# Patient Record
Sex: FEMALE | Race: WHITE | Employment: OTHER | URBAN - METROPOLITAN AREA
[De-identification: names, ages, dates, MRNs, and addresses within clinical notes are randomized per-mention and may not be internally consistent; named-entity substitution may affect disease eponyms.]

---

## 2017-02-05 ENCOUNTER — HOSPITAL ENCOUNTER (EMERGENCY)
Facility: CLINIC | Age: 82
Discharge: HOME | End: 2017-02-05
Attending: EMERGENCY MEDICINE | Admitting: EMERGENCY MEDICINE
Payer: MEDICARE

## 2017-02-05 DIAGNOSIS — K59.00 CONSTIPATION, UNSPECIFIED CONSTIPATION TYPE: ICD-10-CM

## 2017-02-05 PROCEDURE — 99282 EMERGENCY DEPT VISIT SF MDM: CPT

## 2017-02-05 ASSESSMENT — ENCOUNTER SYMPTOMS
CONSTIPATION: 1
FEVER: 0
ABDOMINAL PAIN: 0

## 2017-02-05 NOTE — ED PROVIDER NOTES
History     Chief Complaint:  Constipation    HPI   Christy Rueda is a 89 year old female with a history of TIA, chronic constipation on senna, hypertension, hyperlipidemia, and hypothyroidism who presents with her son, Oneil, to the emergency department today for evaluation of constipation. Ms. Rueda has bene suffering from constipation for the last few days. Earlier today, nurses gave patient 2 tablets of Dulcolax with no improvement in symptoms prompting visit. Of note, patient's last bowel movement was 3-4 days prior. Patient also reports this morning, she had vomited. Here, patient endorses rectal pain although it is gone after  having a large bowel movement. No reported fever or abdominal pain. Patient currently lives in assisted living and uses a walker for ambulation.     Allergies:  Demerol  Levaquin  Morphine  Penicillins    Medications:    Sennosides (Senokot) 8.6 Mg Tablet  Polyethylene Glycol (Miralax/Glycolax) Packet  Ciprofloxacin Po  Bisacodyl Re  Tramadol Hcl Po  Sodium Phosphate (Fleet Enema) 7-19 Gm/118ml Rectal Enema  Psyllium (Metamucil/Konsyl) Packet  Acetaminophen (Tylenol Po)  Amlodipine Besylate (Norvasc Po)  Vitamin D, Cholecalciferol, Po  Hydrocodone-Acetaminophen (Vicodin) 5-500 Mg Per Tablet  Aspirin 81 Mg Ec Tablet  Omeprazole Po  Atorvastatin Calcium Po  Losartan Potassium Po  Metoprolol Succinate Po  Levothyroxine Sodium (Levoxyl Po)  Omega-3 Fatty Acids (Omega-3 Fish Oil Po)  Docusate Sodium Po  Nitrofurantoin Monohyd Macro (Macrobid Po)    Past Medical History:    Unresponsive episode  ACP  Transient cerebral ischemia  DNR (do not resuscitate)    Compression fracture   TIA (transient ischemic attack)   Radial head fracture   Hyperlipidemia   Cerebral atherosclerosis   Essential hypertension   Heart disease   Calculus of gallbladder   Shortness of breath    Malignant neoplasm of female breast (HC)    Osteoporosis   Esophageal reflux   Constipation   Hypothyroidism   Essential  hypertension   Spinal stenosis, lumbar region, without neurogenic claudication    Past Surgical History:    Spine Surgery   Knee Replacement   Thyroidectomy     Septoplasty      Section x 3  Cataract Removal     Breast Surgery    Family History:    History reviewed. No pertinent family history.     Social History:  Marital Status:   [5]  Tobacco: Former Smoker-- 0.50 packs/day for 32 years  Alcohol: Positive (occassionally once a week)  Presents with Oneil, the son.   PCP: Kathy Pratt  Currently lives in independent living at [facility].       Review of Systems   Constitutional: Negative for fever.   Gastrointestinal: Positive for constipation. Negative for abdominal pain.   All other systems reviewed and are negative.    Physical Exam   First Vitals:     No data found.    Physical Exam  Constitutional:  Oriented to person, place, and time.      Appears well-developed and well-nourished. Hard of hearing.  HENT:   Head:    Normocephalic and atraumatic. Bilateral hearing aids.  Right Ear:   Tympanic membrane and external ear normal.   Left Ear:   Tympanic membrane and external ear normal.   Mouth/Throat:   Oropharynx is clear and moist.      Mucous membranes are normal.   Eyes:    Conjunctivae normal and EOM are normal.      Pupils are equal, round, and reactive to light.   Neck:    Normal range of motion. Neck supple.   Cardiovascular:  Normal rate, regular rhythm, S1 normal and S2 normal.      No gallop and no friction rub. No murmur heard.  Pulmonary/Chest:  Breath sounds normal. No respiratory distress.      No wheezes. No rhonchi. No rales.   Abdominal:   Soft. No hepatosplenomegaly.      Active bowel sounds. Mildly distended.      Mild diffuse tenderness to palpation.      No rebound and no CVA tenderness.   Musculoskeletal:  Normal range of motion.   Genitourinary   Erythema around rectum. Minimal stool in rectal vault.   Neurological:   Alert and oriented to person, place, and time.  "Normal strength.      GCS eye subscore is 4. GCS verbal subscore is 5.      GCS motor subscore is 6.   Skin:    Skin is warm and dry.   Psychiatric:   Normal mood and affect.      Speech is normal and behavior is normal.      Judgment and thought content normal.      Cognition and memory are normal.     Emergency Department Course   Emergency Department Course:  While waiting in ED room, nurse notes patient had a large bowel movement and has a relief in her symptoms.     Nursing notes and vitals reviewed.  15:11 I performed an exam of the patient as documented above.    15:28 I called to nurse Maia at [facility], but left a message to give me a call back.     15:44 Recheck patient.     15:49 Call back from nurse Maia. She stated patient was brought in for impaction and as long as she had a bowel movement, then she can return.     15:50 Recheck patient. Plan explained to the Patient and son. Patient discharged home with instructions regarding supportive care, medications, and reasons to return. The importance of close follow-up was reviewed.  Impression & Plan    Medical Decision Making:  Christy Rueda is a 89 year old female with a history of constipation who was sent here due to \"impaction\" per home care nurse. Before I saw the patient, she had a large bowel movement. She is feeling better and wondering why she is here. Her examination is unremarkable. There is no stool in the rectal vault. She is on stool softners and has laxative regimen at her place and she can be followed as needed.     Assessment: Constipation relived.   Disposition: Home  Discharge Instructions: Follow up as needed    Diagnosis:    ICD-10-CM    1. Constipation, unspecified constipation type K59.00        Disposition:  discharged to home with sonOneil.     Scribe Disclosure:  GREGORY, Page Queen, am serving as a scribe at 3:11 PM on 2/5/2017 to document services personally performed by Edna Castelan MD, " based on my observations and the provider's statements to me.  Page Queen  2/5/2017    EMERGENCY DEPARTMENT        Edna Castelan MD  02/05/17 [id number]

## 2017-02-05 NOTE — ED NOTES
Patient had large formed BM in brief. Also noted to have several more formed stools fall when cleaning patient up. Patient states she feels better now.  Urinary cath clamped at patients leg.

## 2017-02-05 NOTE — ED AVS SNAPSHOT
Emergency Department    [address] [facility] [address]    Phone:  [phone]    Fax:  [phone]                                       Christy Rueda   MRN: [id number]    Department:   Emergency Department   Date of Visit:  2/5/2017           Patient Information     Date Of Birth          8/31/1915        Your diagnoses for this visit were:     Constipation, unspecified constipation type        You were seen by Edna Castelan MD.      Follow-up Information     Follow up with Kathy Pratt    Specialty:  Internal Medicine    Why:  As needed    Contact information:    [facility]  [address] [facility] [address]  [facility] [id number] [phone]          Discharge Instructions       Discharge Instructions  Constipation  Your doctor has diagnosed you with constipation. Constipation can cause severe cramping pain and your physician thinks this is the cause of your abdominal pain today.  People usually recognize that they are constipated because they have difficulty having bowel movements, are not having bowel movements frequently enough, or are not having large enough bowel movements. Sometimes, especially in children or older people, you do not recognize that you are constipated until it becomes severe. The most common cause of constipation is a combination of lack of exercise and not eating enough fruits, vegetables and whole grains. Constipation can also be a side effect of medications, such as narcotics, or may be caused by a disease of the digestive system.    Return to the Emergency Department if:    Your abdominal pain worsens or does not improve after a bowel movement.    You become very weak.    You get an oral temperature above 102oF or as directed by your doctor.    You have blood in your stools (bright red or black, tarry stools).    You keep throwing up or can t drink liquids.    Your see blood when you throw up.    Your stomach gets bloated or bigger.    You have new  symptoms or anything that worries you.    What can I do to help myself?    If your doctor gave you a cathartic medication, like magnesium citrate or GoLytely  (polyethylene glycol), you can expect to have cramps and gas pains after taking it. You can expect to have a number of bowel movements and even diarrhea in the course of clearing your bowels.  You will know your bowels have been cleaned out after you pass clear liquid. The cramps and gas should let up after you have emptied your bowels. You may want to wait until morning to take this type of medication so you aren t up in the night.     Sometimes instead of cathartics, we recommend laxatives like milk of magnesia to move your bowels more slowly, or an enema to help the bowels to move. Read and follow the package directions, or follow your physician s instructions.    Once you have become very constipated, it takes time for your bowels to return to normal and you need to be very careful to prevent becoming constipated again. Take a laxative if you don t move your bowels at least every two days.       Eat foods that have a lot of fiber. Good choices are fruits, vegetables, prune juice, apple juice and high fiber cereal. Limit dairy products such as milk and cheese, since these can make constipation worse.     Drink plenty of water and other fluids.     When you feel the need to go to the bathroom, go to the bathroom. Don t hold it.    Miralax , Metamucil , Colace , Senna or fiber supplements can be used daily.  Miralax  daily is often the best choice for children.    FOLLOW UP WITH YOUR REGULAR DOCTOR IF YOUR CONSTIPATION IS NOT IMPROVING.  Sometimes, chronic constipation requires further testing to determine the cause. If you are over 50 years old, you may need a colonoscopy if you have not had one before.   If you were given a prescription for medicine here today, be sure to read all of the information (including the package insert) that comes with your  prescription.  This will include important information about the medicine, its side effects, and any warnings that you need to know about.  The pharmacist who fills the prescription can provide more information and answer questions you may have about the medicine.  If you have questions or concerns that the pharmacist cannot address, please call or return to the Emergency Department.   Opioid Medication Information    Pain medications are among the most commonly prescribed medicines, so we are including this information for all our patients. If you did not receive pain medication or get a prescription for pain medicine, you can ignore it.     You may have been given a prescription for an opioid (narcotic) pain medicine and/or have received a pain medicine while here in the Emergency Department. These medicines can make you drowsy or impaired. You must not drive, operate dangerous equipment, or engage in any other dangerous activities while taking these medications. If you drive while taking these medications, you could be arrested for DUI, or driving under the influence. Do not drink any alcohol while you are taking these medications.     Opioid pain medications can cause addiction. If you have a history of chemical dependency of any type, you are at a higher risk of becoming addicted to pain medications.  Only take these prescribed medications to treat your pain when all other options have been tried. Take it for as short a time and as few doses as possible. Store your pain pills in a secure place, as they are frequently stolen and provide a dangerous opportunity for children or visitors in your house to start abusing these powerful medications. We will not replace any lost or stolen medicine.  As soon as your pain is better, you should flush all your remaining medication.     Many prescription pain medications contain Tylenol  (acetaminophen), including Vicodin , Tylenol #3 , Norco , Lortab , and Percocet .  You  should not take any extra pills of Tylenol  if you are using these prescription medications or you can get very sick.  Do not ever take more than 3000 mg of acetaminophen in any 24 hour period.    All opioids tend to cause constipation. Drink plenty of water and eat foods that have a lot of fiber, such as fruits, vegetables, prune juice, apple juice and high fiber cereal.  Take a laxative if you don t move your bowels at least every other day. Miralax , Milk of Magnesia, Colace , or Senna  can be used to keep you regular.      Remember that you can always come back to the Emergency Department if you are not able to see your regular doctor in the amount of time listed above, if you get any new symptoms, or if there is anything that worries you.      24 Hour Appointment Hotline       To make an appointment at any [facility] call [phone] ([phone]). If you don't have a family doctor or clinic, we will help you find one. [facility] clinics are conveniently located to serve the needs of you and your family.             Review of your medicines      Our records show that you are taking the medicines listed below. If these are incorrect, please call your family doctor or clinic.        Dose / Directions Last dose taken    aspirin 81 MG EC tablet   Dose:  81 mg   Quantity:  90 tablet        Take 1 tablet by mouth daily.   Refills:  3        ATORVASTATIN CALCIUM PO   Dose:  10 mg        Take 10 mg by mouth At Bedtime.   Refills:  0        BISACODYL RE   Dose:  1 suppository        Place 1 suppository rectally daily as needed   Refills:  0        CIPROFLOXACIN PO   Dose:  250 mg        Take 250 mg by mouth daily as needed   Refills:  0        DOCUSATE SODIUM PO   Dose:  100 mg        Take 100 mg by mouth At Bedtime.   Refills:  0        HYDROcodone-acetaminophen 5-500 MG per tablet   Commonly known as:  VICODIN   Dose:  1-2 tablet        Take 1-2 tablets by mouth every 6 hours as needed for moderate to severe  pain   Refills:  0        LEVOXYL PO   Dose:  88 mcg        Take 88 mcg by mouth daily.   Refills:  0        LOSARTAN POTASSIUM PO   Dose:  100 mg        Take 100 mg by mouth daily   Refills:  0        MACROBID PO   Dose:  200 mg        Take 200 mg by mouth. Daily x2 days; as needed each cath change.   Refills:  0        METOPROLOL SUCCINATE PO   Dose:  50 mg        Take 50 mg by mouth daily   Refills:  0        NORVASC PO   Dose:  2.5 mg   Indication:  High Blood Pressure        Take 2.5 mg by mouth daily   Refills:  0        OMEGA-3 FISH OIL PO   Dose:  1 g        Take 1 g by mouth daily.   Refills:  0        OMEPRAZOLE PO   Dose:  20 mg        Take 20 mg by mouth daily.   Refills:  0        polyethylene glycol Packet   Commonly known as:  MIRALAX/GLYCOLAX   Dose:  1 packet        Take 1 packet by mouth daily   Refills:  0        psyllium Packet   Commonly known as:  METAMUCIL/KONSYL   Dose:  1 packet        Take 1 packet by mouth daily as needed for constipation   Refills:  0        sennosides 8.6 MG tablet   Commonly known as:  SENOKOT   Dose:  1 tablet        Take 1 tablet by mouth daily   Refills:  0        sodium phosphate 7-19 GM/118ML rectal enema   Dose:  1 enema        Place 1 enema rectally daily as needed for constipation   Refills:  0        TRAMADOL HCL PO   Dose:  50 mg        Take 50 mg by mouth every 6 hours as needed for moderate to severe pain   Refills:  0        TYLENOL PO   Dose:  1000 mg   Indication:  Pain        Take 1,000 mg by mouth 2 times daily   Refills:  0        VITAMIN D (CHOLECALCIFEROL) PO   Dose:  1000 Units        Take 1,000 Units by mouth daily   Refills:  0                Orders Needing Specimen Collection     None      Pending Results     No orders found from 2/4/2017 to 2/6/2017.            Pending Culture Results     No orders found from 2/4/2017 to 2/6/2017.             Test Results from your hospital stay            Clinical Quality Measure: Blood Pressure Screening      "Your blood pressure was checked while you were in the emergency department today. The last reading we obtained was    . Please read the guidelines below about what these numbers mean and what you should do about them.  If your systolic blood pressure (the top number) is less than 120 and your diastolic blood pressure (the bottom number) is less than 80, then your blood pressure is normal. There is nothing more that you need to do about it.  If your systolic blood pressure (the top number) is 120-139 or your diastolic blood pressure (the bottom number) is 80-89, your blood pressure may be higher than it should be. You should have your blood pressure rechecked within a year by a primary care provider.  If your systolic blood pressure (the top number) is 140 or greater or your diastolic blood pressure (the bottom number) is 90 or greater, you may have high blood pressure. High blood pressure is treatable, but if left untreated over time it can put you at risk for heart attack, stroke, or kidney failure. You should have your blood pressure rechecked by a primary care provider within the next 4 weeks.  If your provider in the emergency department today gave you specific instructions to follow-up with your doctor or provider even sooner than that, you should follow that instruction and not wait for up to 4 weeks for your follow-up visit.        Thank you for choosing [facility]       Thank you for choosing [facility] for your care. Our goal is always to provide you with excellent care. Hearing back from our patients is one way we can continue to improve our services. Please take a few minutes to complete the written survey that you may receive in the mail after you visit with us. Thank you!        [employer] Information     [employer] lets you send messages to your doctor, view your test results, renew your prescriptions, schedule appointments and more. To sign up, go to [email or web address] . Click on \"Log in\" on the left side " "of the screen, which will take you to the Welcome page. Then click on \"Sign up Now\" on the right side of the page.     You will be asked to enter the access code listed below, as well as some personal information. Please follow the directions to create your username and password.     Your access code is: [email or web address]  Expires: 2017  3:51 PM     Your access code will  in 90 days. If you need help or a new code, please call your [facility] clinic or [phone].        Care EveryWhere ID     This is your Care EveryWhere ID. This could be used by other organizations to access your [facility] medical records  [id number]        After Visit Summary       This is your record. Keep this with you and show to your community pharmacist(s) and doctor(s) at your next visit.                  "

## 2017-02-05 NOTE — DISCHARGE INSTRUCTIONS
Discharge Instructions  Constipation  Your doctor has diagnosed you with constipation. Constipation can cause severe cramping pain and your physician thinks this is the cause of your abdominal pain today.  People usually recognize that they are constipated because they have difficulty having bowel movements, are not having bowel movements frequently enough, or are not having large enough bowel movements. Sometimes, especially in children or older people, you do not recognize that you are constipated until it becomes severe. The most common cause of constipation is a combination of lack of exercise and not eating enough fruits, vegetables and whole grains. Constipation can also be a side effect of medications, such as narcotics, or may be caused by a disease of the digestive system.    Return to the Emergency Department if:    Your abdominal pain worsens or does not improve after a bowel movement.    You become very weak.    You get an oral temperature above 102oF or as directed by your doctor.    You have blood in your stools (bright red or black, tarry stools).    You keep throwing up or can t drink liquids.    Your see blood when you throw up.    Your stomach gets bloated or bigger.    You have new symptoms or anything that worries you.    What can I do to help myself?    If your doctor gave you a cathartic medication, like magnesium citrate or GoLytely  (polyethylene glycol), you can expect to have cramps and gas pains after taking it. You can expect to have a number of bowel movements and even diarrhea in the course of clearing your bowels.  You will know your bowels have been cleaned out after you pass clear liquid. The cramps and gas should let up after you have emptied your bowels. You may want to wait until morning to take this type of medication so you aren t up in the night.     Sometimes instead of cathartics, we recommend laxatives like milk of magnesia to move your bowels more slowly, or an enema to  help the bowels to move. Read and follow the package directions, or follow your physician s instructions.    Once you have become very constipated, it takes time for your bowels to return to normal and you need to be very careful to prevent becoming constipated again. Take a laxative if you don t move your bowels at least every two days.       Eat foods that have a lot of fiber. Good choices are fruits, vegetables, prune juice, apple juice and high fiber cereal. Limit dairy products such as milk and cheese, since these can make constipation worse.     Drink plenty of water and other fluids.     When you feel the need to go to the bathroom, go to the bathroom. Don t hold it.    Miralax , Metamucil , Colace , Senna or fiber supplements can be used daily.  Miralax  daily is often the best choice for children.    FOLLOW UP WITH YOUR REGULAR DOCTOR IF YOUR CONSTIPATION IS NOT IMPROVING.  Sometimes, chronic constipation requires further testing to determine the cause. If you are over 50 years old, you may need a colonoscopy if you have not had one before.   If you were given a prescription for medicine here today, be sure to read all of the information (including the package insert) that comes with your prescription.  This will include important information about the medicine, its side effects, and any warnings that you need to know about.  The pharmacist who fills the prescription can provide more information and answer questions you may have about the medicine.  If you have questions or concerns that the pharmacist cannot address, please call or return to the Emergency Department.   Opioid Medication Information    Pain medications are among the most commonly prescribed medicines, so we are including this information for all our patients. If you did not receive pain medication or get a prescription for pain medicine, you can ignore it.     You may have been given a prescription for an opioid (narcotic) pain medicine  and/or have received a pain medicine while here in the Emergency Department. These medicines can make you drowsy or impaired. You must not drive, operate dangerous equipment, or engage in any other dangerous activities while taking these medications. If you drive while taking these medications, you could be arrested for DUI, or driving under the influence. Do not drink any alcohol while you are taking these medications.     Opioid pain medications can cause addiction. If you have a history of chemical dependency of any type, you are at a higher risk of becoming addicted to pain medications.  Only take these prescribed medications to treat your pain when all other options have been tried. Take it for as short a time and as few doses as possible. Store your pain pills in a secure place, as they are frequently stolen and provide a dangerous opportunity for children or visitors in your house to start abusing these powerful medications. We will not replace any lost or stolen medicine.  As soon as your pain is better, you should flush all your remaining medication.     Many prescription pain medications contain Tylenol  (acetaminophen), including Vicodin , Tylenol #3 , Norco , Lortab , and Percocet .  You should not take any extra pills of Tylenol  if you are using these prescription medications or you can get very sick.  Do not ever take more than 3000 mg of acetaminophen in any 24 hour period.    All opioids tend to cause constipation. Drink plenty of water and eat foods that have a lot of fiber, such as fruits, vegetables, prune juice, apple juice and high fiber cereal.  Take a laxative if you don t move your bowels at least every other day. Miralax , Milk of Magnesia, Colace , or Senna  can be used to keep you regular.      Remember that you can always come back to the Emergency Department if you are not able to see your regular doctor in the amount of time listed above, if you get any new symptoms, or if there is  anything that worries you.

## 2017-02-05 NOTE — ED AVS SNAPSHOT
Emergency Department    [address] [place] [address]    Phone:  [phone]    Fax:  [phone]                                       Christy Rueda   MRN: [id number]    Department:   Emergency Department   Date of Visit:  2/5/2017           After Visit Summary Signature Page     I have received my discharge instructions, and my questions have been answered. I have discussed any challenges I see with this plan with the nurse or doctor.    ..........................................................................................................................................  Patient/Patient Representative Signature      ..........................................................................................................................................  Patient Representative Print Name and Relationship to Patient    ..................................................               ................................................  Date                                            Time    ..........................................................................................................................................  Reviewed by Signature/Title    ...................................................              ..............................................  Date                                                            Time

## 2017-04-27 ENCOUNTER — HOSPITAL ENCOUNTER (EMERGENCY)
Facility: CLINIC | Age: 82
Discharge: HOME | End: 2017-04-28
Attending: EMERGENCY MEDICINE | Admitting: EMERGENCY MEDICINE
Payer: MEDICARE

## 2017-04-27 VITALS
RESPIRATION RATE: 15 BRPM | HEART RATE: 76 BPM | WEIGHT: 114 LBS | SYSTOLIC BLOOD PRESSURE: 182 MMHG | TEMPERATURE: 98.1 F | OXYGEN SATURATION: 96 % | HEIGHT: 62 IN | DIASTOLIC BLOOD PRESSURE: 84 MMHG | BODY MASS INDEX: 20.98 KG/M2

## 2017-04-27 DIAGNOSIS — Z46.6 URINARY CATHETER (FOLEY) CHANGE REQUIRED: ICD-10-CM

## 2017-04-27 PROCEDURE — 99283 EMERGENCY DEPT VISIT LOW MDM: CPT

## 2017-04-27 PROCEDURE — 51702 INSERT TEMP BLADDER CATH: CPT

## 2017-04-27 NOTE — ED AVS SNAPSHOT
Emergency Department    [address] [facility] [address]    Phone:  [phone]    Fax:  [phone]                                       Christy Rueda   MRN: [id number]    Department:   Emergency Department   Date of Visit:  4/27/2017           Patient Information     Date Of Birth          8/31/1915        Your diagnoses for this visit were:     Urinary catheter (Hidalgo) change required        You were seen by Trierweiler, Chad A, MD.      Follow-up Information     Follow up with Kathy Pratt    Specialty:  Internal Medicine    Why:  As needed    Contact information:    [facility]  [address] [facility] [address]  [facility] [id number] [phone]        Discharge References/Attachments     INDWELLING URINARY CATHETER, DISCHARGE INSTRUCTIONS (ENGLISH)      24 Hour Appointment Hotline       To make an appointment at any [facility] [place], call [phone] ([phone]). If you don't have a family doctor or clinic, we will help you find one. [facility] clinics are conveniently located to serve the needs of you and your family.             Review of your medicines      Our records show that you are taking the medicines listed below. If these are incorrect, please call your family doctor or clinic.        Dose / Directions Last dose taken    aspirin 81 MG EC tablet   Dose:  81 mg   Quantity:  90 tablet        Take 1 tablet by mouth daily.   Refills:  3        ATORVASTATIN CALCIUM PO   Dose:  10 mg        Take 10 mg by mouth At Bedtime.   Refills:  0        BISACODYL RE   Dose:  1 suppository        Place 1 suppository rectally daily as needed   Refills:  0        CIPROFLOXACIN PO   Dose:  250 mg        Take 250 mg by mouth daily as needed   Refills:  0        DOCUSATE SODIUM PO   Dose:  100 mg        Take 100 mg by mouth At Bedtime.   Refills:  0        HYDROcodone-acetaminophen 5-500 MG per tablet   Commonly known as:  VICODIN   Dose:  1-2 tablet        Take 1-2 tablets by mouth every 6  hours as needed for moderate to severe pain   Refills:  0        LEVOXYL PO   Dose:  88 mcg        Take 88 mcg by mouth daily.   Refills:  0        LOSARTAN POTASSIUM PO   Dose:  100 mg        Take 100 mg by mouth daily   Refills:  0        MACROBID PO   Dose:  200 mg        Take 200 mg by mouth. Daily x2 days; as needed each cath change.   Refills:  0        METOPROLOL SUCCINATE PO   Dose:  50 mg        Take 50 mg by mouth daily   Refills:  0        NORVASC PO   Dose:  2.5 mg   Indication:  High Blood Pressure        Take 2.5 mg by mouth daily   Refills:  0        OMEGA-3 FISH OIL PO   Dose:  1 g        Take 1 g by mouth daily.   Refills:  0        OMEPRAZOLE PO   Dose:  20 mg        Take 20 mg by mouth daily.   Refills:  0        polyethylene glycol Packet   Commonly known as:  MIRALAX/GLYCOLAX   Dose:  1 packet        Take 1 packet by mouth daily   Refills:  0        psyllium Packet   Commonly known as:  METAMUCIL/KONSYL   Dose:  1 packet        Take 1 packet by mouth daily as needed for constipation   Refills:  0        sennosides 8.6 MG tablet   Commonly known as:  SENOKOT   Dose:  1 tablet        Take 1 tablet by mouth daily   Refills:  0        sodium phosphate 7-19 GM/118ML rectal enema   Dose:  1 enema        Place 1 enema rectally daily as needed for constipation   Refills:  0        TRAMADOL HCL PO   Dose:  50 mg        Take 50 mg by mouth every 6 hours as needed for moderate to severe pain   Refills:  0        TYLENOL PO   Dose:  1000 mg   Indication:  Pain        Take 1,000 mg by mouth 2 times daily   Refills:  0        VITAMIN D (CHOLECALCIFEROL) PO   Dose:  1000 Units        Take 1,000 Units by mouth daily   Refills:  0                Orders Needing Specimen Collection     None      Pending Results     No orders found for last 3 day(s).            Pending Culture Results     No orders found for last 3 day(s).            Test Results From Your Hospital Stay               Clinical Quality Measure: Blood  Pressure Screening     Your blood pressure was checked while you were in the emergency department today. The last reading we obtained was  BP: 182/84 . Please read the guidelines below about what these numbers mean and what you should do about them.  If your systolic blood pressure (the top number) is less than 120 and your diastolic blood pressure (the bottom number) is less than 80, then your blood pressure is normal. There is nothing more that you need to do about it.  If your systolic blood pressure (the top number) is 120-139 or your diastolic blood pressure (the bottom number) is 80-89, your blood pressure may be higher than it should be. You should have your blood pressure rechecked within a year by a primary care provider.  If your systolic blood pressure (the top number) is 140 or greater or your diastolic blood pressure (the bottom number) is 90 or greater, you may have high blood pressure. High blood pressure is treatable, but if left untreated over time it can put you at risk for heart attack, stroke, or kidney failure. You should have your blood pressure rechecked by a primary care provider within the next 4 weeks.  If your provider in the emergency department today gave you specific instructions to follow-up with your doctor or provider even sooner than that, you should follow that instruction and not wait for up to 4 weeks for your follow-up visit.        Thank you for choosing [facility]       Thank you for choosing [facility] for your care. Our goal is always to provide you with excellent care. Hearing back from our patients is one way we can continue to improve our services. Please take a few minutes to complete the written survey that you may receive in the mail after you visit with us. Thank you!        [email or web address] Information     [employer] lets you send messages to your doctor, view your test results, renew your prescriptions, schedule appointments and more. To sign up, go to [email or web address]  "Click on \"Log in\" on the left side of the screen, which will take you to the Welcome page. Then click on \"Sign up Now\" on the right side of the page.     You will be asked to enter the access code listed below, as well as some personal information. Please follow the directions to create your username and password.     Your access code is: [email or web address]  Expires: 2017  4:51 PM     Your access code will  in 90 days. If you need help or a new code, please call your [facility] clinic or [phone].        Care EveryWhere ID     This is your Care EveryWhere ID. This could be used by other organizations to access your [facility] medical records  [id number]        After Visit Summary       This is your record. Keep this with you and show to your community pharmacist(s) and doctor(s) at your next visit.                  "

## 2017-04-27 NOTE — ED AVS SNAPSHOT
Emergency Department    [address] [place] [address]    Phone:  [phone]    Fax:  [phone]                                       Christy Rueda   MRN: [id number]    Department:   Emergency Department   Date of Visit:  4/27/2017           After Visit Summary Signature Page     I have received my discharge instructions, and my questions have been answered. I have discussed any challenges I see with this plan with the nurse or doctor.    ..........................................................................................................................................  Patient/Patient Representative Signature      ..........................................................................................................................................  Patient Representative Print Name and Relationship to Patient    ..................................................               ................................................  Date                                            Time    ..........................................................................................................................................  Reviewed by Signature/Title    ...................................................              ..............................................  Date                                                            Time

## 2017-04-28 NOTE — ED PROVIDER NOTES
"  History     Chief Complaint:  Catheter Problem     HPI   Christy Rueda is a 89 year old female who presents to the emergency department today for evaluation of a catheter problem. The patient presents with her son from her assisted living facility this evening after her catheter fell out. The facility is unable to place a new catheter due to legal reasons. The patient currently has no pain and a new catheter was placed here without difficulty.     Allergies:  Demerol - nausea and vomiting   Levaquin  Morphine   Penicillins - nausea and vomiting      Medications:    Senokot  Miralax  Bisacodyl   Tramadol  Fleet Enema  Norvasc  Aspirin   Omeprazole  Atorvastatin   Losartan Potassium   Metoprolol Succinate   Docusate     Past Medical History:    Transient cerebral ischemia     Past Surgical History:    History reviewed. No pertinent past surgical history.    Family History:    History reviewed. No pertinent family history.     Social History:  The patient was accompanied to the ED by her son.  Smoking Status: Former Smoker  Smokeless Tobacco: Negative  Alcohol Use: Positive  Marital Status:   [5]     Review of Systems   All other systems reviewed and are negative.    Physical Exam   Vitals:   Patient Vitals for the past 24 hrs:   BP Temp Temp src Pulse Resp SpO2 Height Weight   04/27/17 2319 182/84 98.1  F (36.7  C) Temporal 76 15 96 % - -   04/27/17 2318 - - - - - - 1.575 m (5' 2\") 51.7 kg (114 lb)     Physical Exam    Abdomen:  Positive bowel sounds.  Abdomen is soft and non-distended, without focal tenderness.    Musculoskeletal:  Normal movement of all extremities without evidence for deficit.    Skin:  Warm and dry without rashes.    Neurologic:  Non-focal exam without asymmetric weakness or numbness.     Psychiatric:  Normal affect with appropriate interaction with examiner.    Emergency Department Course     Nursing notes and vitals reviewed.  I performed an exam of the patient as documented " above.   0000 Patient was rechecked.   I discussed the treatment plan with the patient and son. They expressed understanding of this plan and consented to discharge. They will be discharged home with instructions for care and follow up. In addition, the patient will return to the emergency department if their symptoms persist, worsen, if new symptoms arise or if there is any concern.  All questions were answered.    Impression & Plan      Medical Decision Making:  This delightful 89 year old presents simply for a change of her Hidalgo catheter, as hers fell out. She wears a chronic indwelling Hidalgo and otherwise has no complaints here for us. The nurse replaced her catheter and she feels comfortable going home. Son has no other medical concerns at this juncture. I invited them back to our facility at any point for any emergent conditions.     Diagnosis:    ICD-10-CM    1. Urinary catheter (Hidalgo) change required Z46.6      Disposition:   Discharge to home    Scribe Disclosure:  I, Yesika Dick, am serving as a scribe at 11:29 PM on 4/27/2017 to document services personally performed by Trierweiler, Chad A, MD, based on my observations and the provider's statements to me.    4/27/2017    EMERGENCY DEPARTMENT       Trierweiler, Chad A, MD  04/28/17 0732

## 2017-06-01 ENCOUNTER — APPOINTMENT (OUTPATIENT)
Dept: GENERAL RADIOLOGY | Facility: CLINIC | Age: 82
DRG: 175 | End: 2017-06-01
Attending: EMERGENCY MEDICINE
Payer: MEDICARE

## 2017-06-01 ENCOUNTER — HOSPITAL ENCOUNTER (INPATIENT)
Facility: CLINIC | Age: 82
LOS: 5 days | Discharge: NURSING, REHAB OR LONG-TERM CARE | DRG: 175 | End: 2017-06-06
Attending: EMERGENCY MEDICINE | Admitting: INTERNAL MEDICINE
Payer: MEDICARE

## 2017-06-01 ENCOUNTER — APPOINTMENT (OUTPATIENT)
Dept: CT IMAGING | Facility: CLINIC | Age: 82
DRG: 175 | End: 2017-06-01
Attending: EMERGENCY MEDICINE
Payer: MEDICARE

## 2017-06-01 ENCOUNTER — APPOINTMENT (OUTPATIENT)
Dept: ULTRASOUND IMAGING | Facility: CLINIC | Age: 82
DRG: 175 | End: 2017-06-01
Attending: INTERNAL MEDICINE
Payer: MEDICARE

## 2017-06-01 DIAGNOSIS — I26.99 OTHER ACUTE PULMONARY EMBOLISM: ICD-10-CM

## 2017-06-01 LAB
ALBUMIN UR-MCNC: 30 MG/DL
ANION GAP SERPL CALCULATED.3IONS-SCNC: 8 MMOL/L (ref 3–14)
APPEARANCE UR: CLEAR
BASOPHILS # BLD AUTO: 0 10E9/L (ref 0–0.2)
BASOPHILS NFR BLD AUTO: 0.1 %
BILIRUB UR QL STRIP: NEGATIVE
BUN SERPL-MCNC: 42 MG/DL (ref 7–30)
CALCIUM SERPL-MCNC: 9.7 MG/DL (ref 8.5–10.1)
CHLORIDE SERPL-SCNC: 107 MMOL/L (ref 94–109)
CO2 SERPL-SCNC: 27 MMOL/L (ref 20–32)
COLOR UR AUTO: YELLOW
CREAT SERPL-MCNC: 1.29 MG/DL (ref 0.52–1.04)
D DIMER PPP FEU-MCNC: 2.2 UG/ML FEU (ref 0–0.5)
DIFFERENTIAL METHOD BLD: ABNORMAL
EOSINOPHIL # BLD AUTO: 0 10E9/L (ref 0–0.7)
EOSINOPHIL NFR BLD AUTO: 0.1 %
ERYTHROCYTE [DISTWIDTH] IN BLOOD BY AUTOMATED COUNT: 14.5 % (ref 10–15)
GFR SERPL CREATININE-BSD FRML MDRD: 38 ML/MIN/1.7M2
GLUCOSE SERPL-MCNC: 117 MG/DL (ref 70–99)
GLUCOSE UR STRIP-MCNC: NEGATIVE MG/DL
HCT VFR BLD AUTO: 35 % (ref 35–47)
HGB BLD-MCNC: 11.4 G/DL (ref 11.7–15.7)
HGB UR QL STRIP: NEGATIVE
IMM GRANULOCYTES # BLD: 0 10E9/L (ref 0–0.4)
IMM GRANULOCYTES NFR BLD: 0.3 %
INR PPP: 1.05 (ref 0.86–1.14)
INTERPRETATION ECG - MUSE: NORMAL
KETONES UR STRIP-MCNC: NEGATIVE MG/DL
LEUKOCYTE ESTERASE UR QL STRIP: ABNORMAL
LMWH PPP CHRO-ACNC: 1.06 IU/ML
LYMPHOCYTES # BLD AUTO: 0.9 10E9/L (ref 0.8–5.3)
LYMPHOCYTES NFR BLD AUTO: 5.4 %
MCH RBC QN AUTO: 33 PG (ref 26.5–33)
MCHC RBC AUTO-ENTMCNC: 32.6 G/DL (ref 31.5–36.5)
MCV RBC AUTO: 101 FL (ref 78–100)
MONOCYTES # BLD AUTO: 1 10E9/L (ref 0–1.3)
MONOCYTES NFR BLD AUTO: 6 %
MUCOUS THREADS #/AREA URNS LPF: PRESENT /LPF
NEUTROPHILS # BLD AUTO: 13.9 10E9/L (ref 1.6–8.3)
NEUTROPHILS NFR BLD AUTO: 88.1 %
NITRATE UR QL: NEGATIVE
NRBC # BLD AUTO: 0 10*3/UL
NRBC BLD AUTO-RTO: 0 /100
NT-PROBNP SERPL-MCNC: 1612 PG/ML (ref 0–1800)
PH UR STRIP: 5.5 PH (ref 5–7)
PLATELET # BLD AUTO: 246 10E9/L (ref 150–450)
POTASSIUM SERPL-SCNC: 4.1 MMOL/L (ref 3.4–5.3)
RADIOLOGIST FLAGS: ABNORMAL
RBC # BLD AUTO: 3.45 10E12/L (ref 3.8–5.2)
RBC #/AREA URNS AUTO: 2 /HPF (ref 0–2)
SODIUM SERPL-SCNC: 142 MMOL/L (ref 133–144)
SP GR UR STRIP: 1.03 (ref 1–1.03)
SQUAMOUS #/AREA URNS AUTO: 2 /HPF (ref 0–1)
TROPONIN I SERPL-MCNC: NORMAL UG/L (ref 0–0.04)
URN SPEC COLLECT METH UR: ABNORMAL
UROBILINOGEN UR STRIP-MCNC: NORMAL MG/DL (ref 0–2)
WBC # BLD AUTO: 15.8 10E9/L (ref 4–11)
WBC #/AREA URNS AUTO: 14 /HPF (ref 0–2)

## 2017-06-01 PROCEDURE — 25000128 H RX IP 250 OP 636: Performed by: INTERNAL MEDICINE

## 2017-06-01 PROCEDURE — 99221 1ST HOSP IP/OBS SF/LOW 40: CPT | Mod: AI | Performed by: INTERNAL MEDICINE

## 2017-06-01 PROCEDURE — 25000125 ZZHC RX 250: Performed by: EMERGENCY MEDICINE

## 2017-06-01 PROCEDURE — 93005 ELECTROCARDIOGRAM TRACING: CPT

## 2017-06-01 PROCEDURE — 25000132 ZZH RX MED GY IP 250 OP 250 PS 637: Mod: GY | Performed by: INTERNAL MEDICINE

## 2017-06-01 PROCEDURE — 87186 SC STD MICRODIL/AGAR DIL: CPT | Performed by: INTERNAL MEDICINE

## 2017-06-01 PROCEDURE — 99285 EMERGENCY DEPT VISIT HI MDM: CPT | Mod: 25

## 2017-06-01 PROCEDURE — 93970 EXTREMITY STUDY: CPT

## 2017-06-01 PROCEDURE — 36415 COLL VENOUS BLD VENIPUNCTURE: CPT | Performed by: INTERNAL MEDICINE

## 2017-06-01 PROCEDURE — 71020 XR CHEST 2 VW: CPT

## 2017-06-01 PROCEDURE — 25000128 H RX IP 250 OP 636: Performed by: EMERGENCY MEDICINE

## 2017-06-01 PROCEDURE — 87088 URINE BACTERIA CULTURE: CPT | Performed by: INTERNAL MEDICINE

## 2017-06-01 PROCEDURE — 99207 ZZC DOWN CODE DUE TO INITIAL EXAM: CPT | Performed by: INTERNAL MEDICINE

## 2017-06-01 PROCEDURE — 85025 COMPLETE CBC W/AUTO DIFF WBC: CPT | Performed by: EMERGENCY MEDICINE

## 2017-06-01 PROCEDURE — 12000000 ZZH R&B MED SURG/OB

## 2017-06-01 PROCEDURE — 81001 URINALYSIS AUTO W/SCOPE: CPT | Performed by: INTERNAL MEDICINE

## 2017-06-01 PROCEDURE — A9270 NON-COVERED ITEM OR SERVICE: HCPCS | Mod: GY | Performed by: INTERNAL MEDICINE

## 2017-06-01 PROCEDURE — 85379 FIBRIN DEGRADATION QUANT: CPT | Performed by: EMERGENCY MEDICINE

## 2017-06-01 PROCEDURE — 71260 CT THORAX DX C+: CPT

## 2017-06-01 PROCEDURE — 84484 ASSAY OF TROPONIN QUANT: CPT | Performed by: EMERGENCY MEDICINE

## 2017-06-01 PROCEDURE — 80048 BASIC METABOLIC PNL TOTAL CA: CPT | Performed by: EMERGENCY MEDICINE

## 2017-06-01 PROCEDURE — 85520 HEPARIN ASSAY: CPT | Performed by: INTERNAL MEDICINE

## 2017-06-01 PROCEDURE — 85610 PROTHROMBIN TIME: CPT | Performed by: EMERGENCY MEDICINE

## 2017-06-01 PROCEDURE — 87086 URINE CULTURE/COLONY COUNT: CPT | Performed by: INTERNAL MEDICINE

## 2017-06-01 PROCEDURE — 83880 ASSAY OF NATRIURETIC PEPTIDE: CPT | Performed by: EMERGENCY MEDICINE

## 2017-06-01 PROCEDURE — 96374 THER/PROPH/DIAG INJ IV PUSH: CPT | Mod: 59

## 2017-06-01 RX ORDER — SODIUM CHLORIDE 9 MG/ML
INJECTION, SOLUTION INTRAVENOUS CONTINUOUS
Status: DISCONTINUED | OUTPATIENT
Start: 2017-06-01 | End: 2017-06-02

## 2017-06-01 RX ORDER — ONDANSETRON 2 MG/ML
4 INJECTION INTRAMUSCULAR; INTRAVENOUS EVERY 6 HOURS PRN
Status: DISCONTINUED | OUTPATIENT
Start: 2017-06-01 | End: 2017-06-06 | Stop reason: HOSPADM

## 2017-06-01 RX ORDER — ACETAMINOPHEN 500 MG
1000 TABLET ORAL 2 TIMES DAILY
Status: DISCONTINUED | OUTPATIENT
Start: 2017-06-01 | End: 2017-06-06 | Stop reason: HOSPADM

## 2017-06-01 RX ORDER — IOPAMIDOL 755 MG/ML
53 INJECTION, SOLUTION INTRAVASCULAR ONCE
Status: COMPLETED | OUTPATIENT
Start: 2017-06-01 | End: 2017-06-01

## 2017-06-01 RX ORDER — ONDANSETRON 4 MG/1
4 TABLET, ORALLY DISINTEGRATING ORAL EVERY 6 HOURS PRN
Status: DISCONTINUED | OUTPATIENT
Start: 2017-06-01 | End: 2017-06-06 | Stop reason: HOSPADM

## 2017-06-01 RX ORDER — ACETAMINOPHEN 325 MG/1
325 TABLET ORAL EVERY 8 HOURS PRN
Status: DISCONTINUED | OUTPATIENT
Start: 2017-06-01 | End: 2017-06-06 | Stop reason: HOSPADM

## 2017-06-01 RX ORDER — AMLODIPINE BESYLATE 2.5 MG/1
2.5 TABLET ORAL DAILY
Status: DISCONTINUED | OUTPATIENT
Start: 2017-06-01 | End: 2017-06-06 | Stop reason: HOSPADM

## 2017-06-01 RX ORDER — LIDOCAINE 40 MG/G
CREAM TOPICAL
Status: DISCONTINUED | OUTPATIENT
Start: 2017-06-01 | End: 2017-06-06 | Stop reason: HOSPADM

## 2017-06-01 RX ORDER — PROCHLORPERAZINE 25 MG
12.5 SUPPOSITORY, RECTAL RECTAL EVERY 12 HOURS PRN
Status: DISCONTINUED | OUTPATIENT
Start: 2017-06-01 | End: 2017-06-06 | Stop reason: HOSPADM

## 2017-06-01 RX ORDER — PROCHLORPERAZINE MALEATE 5 MG
5 TABLET ORAL EVERY 6 HOURS PRN
Status: DISCONTINUED | OUTPATIENT
Start: 2017-06-01 | End: 2017-06-06 | Stop reason: HOSPADM

## 2017-06-01 RX ORDER — WARFARIN SODIUM 2.5 MG/1
2.5 TABLET ORAL
Status: COMPLETED | OUTPATIENT
Start: 2017-06-01 | End: 2017-06-01

## 2017-06-01 RX ORDER — NALOXONE HYDROCHLORIDE 0.4 MG/ML
.1-.4 INJECTION, SOLUTION INTRAMUSCULAR; INTRAVENOUS; SUBCUTANEOUS
Status: DISCONTINUED | OUTPATIENT
Start: 2017-06-01 | End: 2017-06-06 | Stop reason: HOSPADM

## 2017-06-01 RX ORDER — MULTIPLE VITAMINS W/ MINERALS TAB 9MG-400MCG
1 TAB ORAL DAILY
COMMUNITY

## 2017-06-01 RX ORDER — LEVOTHYROXINE SODIUM 88 UG/1
88 TABLET ORAL DAILY
Status: DISCONTINUED | OUTPATIENT
Start: 2017-06-01 | End: 2017-06-06 | Stop reason: HOSPADM

## 2017-06-01 RX ORDER — BISACODYL 10 MG
10 SUPPOSITORY, RECTAL RECTAL DAILY PRN
Status: DISCONTINUED | OUTPATIENT
Start: 2017-06-01 | End: 2017-06-06 | Stop reason: HOSPADM

## 2017-06-01 RX ORDER — ATORVASTATIN CALCIUM 10 MG/1
10 TABLET, FILM COATED ORAL AT BEDTIME
Status: DISCONTINUED | OUTPATIENT
Start: 2017-06-01 | End: 2017-06-06 | Stop reason: HOSPADM

## 2017-06-01 RX ORDER — METOPROLOL SUCCINATE 50 MG/1
50 TABLET, EXTENDED RELEASE ORAL DAILY
Status: DISCONTINUED | OUTPATIENT
Start: 2017-06-01 | End: 2017-06-06 | Stop reason: HOSPADM

## 2017-06-01 RX ORDER — POLYETHYLENE GLYCOL 3350 17 G/17G
17 POWDER, FOR SOLUTION ORAL
Status: DISCONTINUED | OUTPATIENT
Start: 2017-06-02 | End: 2017-06-06 | Stop reason: HOSPADM

## 2017-06-01 RX ORDER — DOCUSATE SODIUM 100 MG/1
300 CAPSULE, LIQUID FILLED ORAL EVERY EVENING
Status: DISCONTINUED | OUTPATIENT
Start: 2017-06-01 | End: 2017-06-06 | Stop reason: HOSPADM

## 2017-06-01 RX ADMIN — HEPARIN SODIUM 850 UNITS/HR: 10000 INJECTION, SOLUTION INTRAVENOUS at 14:44

## 2017-06-01 RX ADMIN — OMEPRAZOLE 20 MG: 20 CAPSULE, DELAYED RELEASE ORAL at 17:40

## 2017-06-01 RX ADMIN — SODIUM CHLORIDE 500 ML: 9 INJECTION, SOLUTION INTRAVENOUS at 14:10

## 2017-06-01 RX ADMIN — METOPROLOL SUCCINATE 50 MG: 50 TABLET, EXTENDED RELEASE ORAL at 17:40

## 2017-06-01 RX ADMIN — IOPAMIDOL 53 ML: 755 INJECTION, SOLUTION INTRAVENOUS at 13:06

## 2017-06-01 RX ADMIN — LEVOTHYROXINE SODIUM 88 MCG: 88 TABLET ORAL at 17:43

## 2017-06-01 RX ADMIN — PSYLLIUM HUSK 1 PACKET: 3.4 POWDER ORAL at 17:41

## 2017-06-01 RX ADMIN — DOCUSATE SODIUM 300 MG: 100 CAPSULE, LIQUID FILLED ORAL at 21:10

## 2017-06-01 RX ADMIN — WARFARIN SODIUM 2.5 MG: 2.5 TABLET ORAL at 17:40

## 2017-06-01 RX ADMIN — SODIUM CHLORIDE 81 ML: 9 INJECTION, SOLUTION INTRAVENOUS at 13:06

## 2017-06-01 RX ADMIN — AMLODIPINE BESYLATE 2.5 MG: 2.5 TABLET ORAL at 17:40

## 2017-06-01 RX ADMIN — Medication 3800 UNITS: at 14:44

## 2017-06-01 RX ADMIN — ATORVASTATIN CALCIUM 10 MG: 10 TABLET, FILM COATED ORAL at 21:11

## 2017-06-01 RX ADMIN — SODIUM CHLORIDE: 9 INJECTION, SOLUTION INTRAVENOUS at 17:39

## 2017-06-01 RX ADMIN — ACETAMINOPHEN 1000 MG: 500 TABLET, FILM COATED ORAL at 22:13

## 2017-06-01 ASSESSMENT — ENCOUNTER SYMPTOMS
WEAKNESS: 1
CONFUSION: 1
WHEEZING: 1
SHORTNESS OF BREATH: 1

## 2017-06-01 ASSESSMENT — ACTIVITIES OF DAILY LIVING (ADL)
FALL_HISTORY_WITHIN_LAST_SIX_MONTHS: YES
DRESS: 2-->ASSISTIVE PERSON
RETIRED_COMMUNICATION: 0-->UNDERSTANDS/COMMUNICATES WITHOUT DIFFICULTY
WHICH_OF_THE_ABOVE_FUNCTIONAL_RISKS_HAD_A_RECENT_ONSET_OR_CHANGE?: AMBULATION;TRANSFERRING
RETIRED_EATING: 2-->ASSISTIVE PERSON
BATHING: 2-->ASSISTIVE PERSON
COGNITION: 1 - ATTENTION OR MEMORY DEFICITS
TOILETING: 0-->INDEPENDENT
TRANSFERRING: 0-->INDEPENDENT
NUMBER_OF_TIMES_PATIENT_HAS_FALLEN_WITHIN_LAST_SIX_MONTHS: 1
SWALLOWING: 0-->SWALLOWS FOODS/LIQUIDS WITHOUT DIFFICULTY
AMBULATION: 1-->ASSISTIVE EQUIPMENT

## 2017-06-01 NOTE — ED PROVIDER NOTES
History     Chief Complaint:  Shortness of Breath      HPI   Christy Rueda is a 89 year old female with a history of CHF presents to the emergency department today for evaluation of shortness of pain. The patient presents with concerns for shortness of breath. Patient states symptoms began a week ago but assisted living facility staff report symptoms began today. Patient also has associated right sided chest pain and wheezing. She was given Asprin and 2 Nitrostat en route. Of note, patient did fall last week with no significant injuries. She does however have significant arthritis with generalized pain at baseline. No change in leg swelling per daughter in law.  Daughter in law also reports patient has had increased confusion from baseline and weakness. Patient was unable to recognize family member last week. She also has indwelling catheter.    Allergies:  Demerol  Levaquin  Morphine  Penicillins    Medications:    sennosides (SENOKOT) 8.6 MG tablet  polyethylene glycol (MIRALAX/GLYCOLAX) packet  CIPROFLOXACIN PO  BISACODYL RE  TRAMADOL HCL PO  sodium phosphate (FLEET ENEMA) 7-19 GM/118ML rectal enema  psyllium (METAMUCIL/KONSYL) packet  Acetaminophen (TYLENOL PO)  AmLODIPine Besylate (NORVASC PO)  VITAMIN D, CHOLECALCIFEROL, PO  HYDROcodone-acetaminophen (VICODIN) 5-500 MG per tablet  aspirin 81 MG EC tablet  OMEPRAZOLE PO  ATORVASTATIN CALCIUM PO  LOSARTAN POTASSIUM PO  METOPROLOL SUCCINATE PO  Levothyroxine Sodium (LEVOXYL PO)  Omega-3 Fatty Acids (OMEGA-3 FISH OIL PO)  DOCUSATE SODIUM PO  Nitrofurantoin Monohyd Macro (MACROBID PO)     Past Medical History:    CHF  Arthritis     Past Surgical History:    History reviewed. No pertinent past surgical history.    Family History:    History reviewed. No pertinent family history.     Social History:  The patient was accompanied to the ED by EMS and daughter.  Smoking Status: Former smoker  Smokeless Tobacco: Never used  Alcohol Use: Yes  Marital Status:    [5]    Review of Systems   Respiratory: Positive for shortness of breath and wheezing.    Cardiovascular: Positive for chest pain.   Neurological: Positive for weakness.   Psychiatric/Behavioral: Positive for confusion.   All other systems reviewed and are negative.    Physical Exam   Vitals:  Patient Vitals for the past 24 hrs:   BP Temp Temp src Pulse Resp SpO2   06/01/17 1035 99/56 99.3  F (37.4  C) Oral 105 28 91 %     Physical Exam  SKIN:  Warm, dry.  HEMATOLOGIC/IMMUNOLOGIC/LYMPHATIC:  No pallor.  HENT:  No JVD.  EYES:  Conjunctivae normal.  CARDIOVASCULAR:  Regular rate and rhythm.  No murmur.  RESPIRATORY:  No respiratory distress, breath sounds equal and normal.  GASTROINTESTINAL:  Soft, nontender abdomen with active bowel sounds.  No mass or distension.  MUSCULOSKELETAL:  Kyphotic torso/spine.  NEUROLOGIC:  Somnolent.    Emergency Department Course     ECG:  ECG taken at 1039, ECG read at 1045  Sinus tachycardia   Left posterior fascicular block  Anterior infarct, age undetermined   Abnormal ECG  Rate 101 bpm. OR interval 180. QRS duration 94. QT/QTc 364/471. P-R-T axes 27 112 -6.      Imaging:  Radiology findings were communicated with the patient and family who voiced understanding of the findings.    Chest XR, 2 Views:  Enlarged cardiac silhouette is again seen and unchanged.  Mild left basilar atelectasis/consolidation. No significant pleural  effusion on either side. No pneumothorax. Wedge compression deformity  in the upper thoracic spine is again seen and unchanged.  Reading per radiology    Chest CT, IV contrast IV-PE protocol:  1. Right-sided acute pulmonary emboli.  2. Vascular calcifications and tortuous thoracic aorta.  3. Old granulomatous disease left hilum.  4. Innumerable small bilateral pulmonary nodules, most indeterminate  in nature, measuring up to 1 cm. Pulmonary metastases are not  excluded.  5. Cholelithiasis.  Reading per radiology    Laboratory:  Laboratory findings were  communicated with the patient who voiced understanding of the findings.    CBC: WBC: 15.8(H), HGB: 11.4(L) o/w WNL. ()     basic metabolic panel: Glucose: 117(H), BUN: 42(H), Creatinine: 1.29(H), GFR: 38(L)    Troponin (Collected 1059): <0.015    Nt probnp inpatient: 1612    D Dimer (Collected 1059): 2.2(H)    Interventions:  140-  mL IV     Emergency Department Course:  Nursing notes and vitals reviewed.  I performed an exam of the patient as documented above.       IV was inserted and blood was drawn for laboratory testing, results above.    The patient was sent for a XR and CT while in the emergency department, results above.     At 1204 the patient was rechecked.    1230: I spoke with Dr. Cooley of the Radiology service regarding patient's presentation, findings, and plan of care.    I discussed the treatment plan with the patient. They expressed understanding of this plan and consented to admission. I discussed the patient with Dr. Gan, who will admit the patient to a monitored bed for further evaluation and treatment.    I personally reviewed the laboratory results with the Patient and daughter and answered all related questions prior to admission.     Impression & Plan      Medical Decision Making:  This patient presents with difficult to obtain history but it sounds like there was concerns she suffered for dyspnea and chest pain. Large evaluation was undertaken and ultimately CT scan revealed pulmonary embolism in the right which would correlate with her symptoms. Abnormal EKG but negative troponin. So after further discussion with family the patient will be admitted for treatment.         Diagnosis:    ICD-10-CM    1. Other acute pulmonary embolism (H) I26.99          Disposition:   The patient was admitted.      Scribe Disclosure:  Tyron JENKINS, am serving as a scribe at 10:38 AM on 6/1/2017 to document services personally performed by Kike Bernard MD, based on my observations  and the provider's statements to me.    6/1/2017    EMERGENCY DEPARTMENT       Kike Bernard MD  06/01/17 3637

## 2017-06-01 NOTE — ED NOTES
Bed: ED14  Expected date: 6/1/17  Expected time: 10:27 AM  Means of arrival: Ambulance  Comments:  518- Rib Pain

## 2017-06-01 NOTE — IP AVS SNAPSHOT
"` `           [place] [address] [facility]: [phone]                                              INTERAGENCY TRANSFER FORM - NURSING   2017                    Hospital Admission Date: 2017  AMARJIT CASTELLANOS   : 1915  Sex: Female        Attending Provider: Sunita Gan MD     Allergies:  Demerol, Levaquin, Morphine, Penicillins    Infection:  None   Service:  GENERAL MEDI    Ht:  1.473 m (4' 10\")   Wt:  52.4 kg (115 lb 8.3 oz)   Admission Wt:  47.6 kg (105 lb)    BMI:  24.14 kg/m 2   BSA:  1.46 m 2            Patient PCP Information     Provider PCP Type    Kathy Finn      Current Code Status     Date Active Code Status Order ID Comments User Context       Prior      Code Status History     Date Active Date Inactive Code Status Order ID Comments User Context    2017 12:03 PM  DNR/DNI [id number]  Eliseo Douglass DO Outpatient    2017  3:45 PM 2017 12:03 PM DNR/DNI [id number]  Sunita Gan MD Inpatient    2016 10:32 AM 2017  3:45 PM DNR [id number]  Emerson Rutledge PA-C Outpatient    2016  4:08 PM 2016 10:32 AM DNR [id number]  Soren Pepper MD ED    2013  6:24 PM 2013  5:42 PM DNR [id number]  Matt Cordova RN Inpatient      Advance Directives        Does patient have a scanned Advance Directive/ACP document in EPIC?           Yes        Hospital Problems as of 2017              Priority Class Noted POA    Pulmonary embolism (H) Medium  2017 Yes      Non-Hospital Problems as of 2017              Priority Class Noted    Transient cerebral ischemia   2013    Unresponsive episode Medium  2016    ACP (advance care planning)   2016      Immunizations     None         END      ASSESSMENT     Discharge Profile Flowsheet     EXPECTED DISCHARGE     Patient's communication style  spoken language (English or Bilingual) 17 1029    Expected Discharge Date  17 ([place] [facility]) 06/05/17 1119   SKIN      DISCHARGE NEEDS ASSESSMENT     Inspection  Full 06/06/17 0946    Equipment Currently Used at Home  walker, rolling 06/02/17 1424   Skin WDL  ex 06/06/17 0946    Transportation Available  family or friend will provide 09/26/16 1129   Skin Color/Characteristics  redness blanchable 06/06/17 0946    # of Referrals Placed by CTS  Post Acute Facilities;Transportation 06/04/17 1423   Skin Temperature  warm 06/06/17 0946    Equipment Used at Home  walker, standard;grab bar (reacher) 06/19/13 1134   Skin Moisture  dry;flaky 06/06/17 0946    GASTROINTESTINAL (ADULT,PEDIATRIC,OB)     Skin Elasticity  slow return to original state 06/06/17 0315    GI WDL  WDL 06/06/17 0946   Skin Integrity  bruise(s);drain/device(s) 06/06/17 0946    Last Bowel Movement  06/05/17 06/05/17 1300   SAFETY      Passing flatus  yes 06/06/17 0946   Safety WDL  WDL 06/06/17 0946    COMMUNICATION ASSESSMENT                        Assessment WDL (Within Defined Limits) Definitions           Safety WDL     Effective: 09/28/15    Row Information: <b>WDL Definition:</b> Bed in low position, wheels locked; call light in reach; upper side rails up x 2; ID band on<br> <font color=\"gray\"><i>Item=AS safety wdl>>List=AS safety wdl>>Version=F14</i></font>      Skin WDL     Effective: 09/28/15    Row Information: <b>WDL Definition:</b> Warm; dry; intact; elastic; without discoloration; pressure points without redness<br> <font color=\"gray\"><i>Item=AS skin wdl>>List=AS skin wdl>>Version=F14</i></font>      Vitals     Vital Signs Flowsheet     VITAL SIGNS     ANALGESIA SIDE EFFECTS MONITORING      Temp  98.3  F (36.8  C) 06/06/17 1522   Side Effects Monitoring: Respiratory Quality  R 06/02/17 1954    Temp src  Oral 06/06/17 1522   Side Effects Monitoring: Respiratory Depth  N 06/02/17 1954    Resp  18 06/06/17 1522   Side Effects Monitoring: Sedation Level  1 06/02/17 1954    Pulse  69 06/05/17 1527   HEIGHT AND WEIGHT      " "Heart Rate  67 06/06/17 1522   Height  1.473 m (4' 10\") 06/01/17 1226    Pulse/Heart Rate Source  Monitor 06/06/17 1522   Weight  52.4 kg (115 lb 8.3 oz) 06/06/17 0644    BP  144/76 06/06/17 1522   BSA (Calculated - sq m)  1.4 06/01/17 1226    BP Location  Left arm 06/06/17 1522   BMI (Calculated)  21.99 06/01/17 1226    OXYGEN THERAPY     EKG MONITORING      SpO2  93 % 06/06/17 1522   Cardiac Regularity  Regular 06/01/17 1131    O2 Device  Nasal cannula 06/06/17 1522   Cardiac Rhythm  NSR 06/01/17 1131    FiO2 (%)  -- (13) 06/04/17 1125   JOSE COMA SCALE      Oxygen Delivery  1.5 LPM 06/06/17 1522   Best Eye Response  3-->(E3) to speech 06/01/17 1131    PAIN/COMFORT     Best Motor Response  6-->(M6) obeys commands 06/01/17 1131    Patient Currently in Pain  sleeping: patient not able to self report 06/06/17 1210   Best Verbal Response  4-->(V4) confused 06/01/17 1131    Preferred Pain Scale  number (Numeric Rating Pain Scale) 06/01/17 2214   Jose Coma Scale Score  13 06/01/17 1131    0-10 Pain Scale  6 06/01/17 2213   POSITIONING      rFLACC Pain Rating: Face  0-->no particular expression or smile 06/04/17 0043   Body Position  left, side-lying 06/06/17 0425    rFLACC Pain Rating - Legs  0-->normal position or relaxed 06/04/17 0043   Chair  Upright in chair 06/05/17 1914    rFLACC Pain Rating: Activity  0-->lying quietly, normal position, moves easily 06/04/17 0043   Head of Bed (HOB)  HOB at 20-30 degrees 06/06/17 0425    rFLACC Pain Rating - Cry  0-->no cry (awake or asleep) 06/04/17 0043   Positioning/Transfer Devices  pillows 06/06/17 0425    rFLACC Pain Rating - Consolability  0-->content, relaxed 06/04/17 0043   DAILY CARE      rFLACC Score: Activity  0 06/04/17 0043   Activity Type  up in chair 06/06/17 0946    Pain Location  Hip 06/01/17 2214   Activity Level of Assistance  assistance, 1 person 06/06/17 0946    Pain Orientation  Right 06/01/17 2214   Activity Assistive Device  gait belt;walker " 06/06/17 0946    Pain Intervention(s)  Medication (See eMAR) 06/01/17 2852   Additional Documentation  Activity Device Assistance (Row) 06/06/17 0946            Patient Lines/Drains/Airways Status    Active LINES/DRAINS/AIRWAYS     Name: Placement date: Placement time: Site: Days: Last dressing change:    Urethral Catheter 16 fr 09/25/16   1300      254     Urethral Catheter Straight-tip 16 fr 04/28/17   0000   Straight-tip   39     Urethral Catheter               Wound 06/01/17 Coccyx Abrasion(s) scabbing, blanchable redness 06/01/17   1600   Coccyx   5     Wound 06/01/17 Right;Lower  Puncture  06/01/17   1600      5     Wound 06/01/17 Bilateral Heel 06/01/17   1600   Heel   5             Patient Lines/Drains/Airways Status    Active PICC/CVC     None            Intake/Output Detail Report     Date Intake     Output Net    Shift P.O. I.V. IV Piggyback Total Urine Total       Day 06/05/17 0700 - 06/05/17 1459 -- -- -- -- 350 350 -350    Joanna 06/05/17 1500 - 06/05/17 2259 90 -- -- 90 450 450 -360    Noc 06/05/17 2300 - 06/06/17 0659 70 -- -- 70 200 200 -130    Day 06/06/17 0700 - 06/06/17 1459 -- -- -- -- -- -- 0    Joanna 06/06/17 1500 - 06/06/17 2259 -- -- -- -- 550 550 -550      Last Void/BM       Most Recent Value    Urine Occurrence 1 at 06/05/2017 1426    Stool Occurrence 1 at 06/03/2017 1000      Case Management/Discharge Planning     Case Management/Discharge Planning Flowsheet     REFERRAL INFORMATION     Major Change/Loss/Stressor  none 06/01/17 1806    Admission Type  inpatient 06/02/17 1540   EXPECTED DISCHARGE      Arrived From  home or self-care 09/26/16 1128   Expected Discharge Date  06/06/17 ([facility]) 06/05/17 1119    Referral Source  physician 06/02/17 1540   DISCHARGE PLANNING      # of Referrals Placed by CTS  Post Acute Facilities;Transportation 06/04/17 1423   Transportation Available  family or friend will provide 09/26/16 1129    Post Acute Facilities  TCU 06/04/17 1423   Equipment  Used at Home  walker, standard;grab bar (reacher) 06/19/13 1134    Reason For Consult  discharge planning;facility placement 06/02/17 1540   FINAL NOTE      Record Reviewed  clinical discipline documentation;history and physical;medical record 06/02/17 1540   Final Note  -- (D/C to [facility]) 06/06/17 1402     Assigned to Case  Cherry Escalona 06/04/17 1423   FINAL RESOURCES      LIVING ENVIRONMENT     Equipment Currently Used at Home  walker, rolling 06/02/17 1424    Lives With  alone 06/02/17 1540   ABUSE RISK SCREEN      Living Arrangements  independent living facility 06/02/17 1540   QUESTION TO PATIENT:  Has a member of your family or a partner(now or in the past) intimidated, hurt, manipulated, or controlled you in any way?  patient declined to answer or is unable to answer 06/01/17 1039    Provides Primary Care For  no one 06/02/17 1540   QUESTION TO PATIENT: Do you feel safe going back to the place where you are living?  patient declined to answer or is unable to answer 06/01/17 1039    ASSESSMENT OF FAMILY/SOCIAL SUPPORT     OBSERVATION: Is there reason to believe there has been maltreatment of a vulnerable adult (ie. Physical/Sexual/Emotional abuse, self neglect, lack of adequate food, shelter, medical care, or financial exploitation)?  no 06/01/17 1039    Marital Status   06/02/17 1540   (R) MENTAL HEALTH SUICIDE RISK      Who is your support system?  Children 06/02/17 1540   Are you depressed or being treated for depression?  No 06/01/17 1804    Description of Support System  Involved;Supportive 06/02/17 1540   HOMICIDE RISK      COPING/STRESS     Homicidal Ideation  no 06/01/17 1039

## 2017-06-01 NOTE — IP AVS SNAPSHOT
` `     [place] [address] [facility]: [phone]                 INTERAGENCY TRANSFER FORM - NOTES (H&P, Discharge Summary, Consults, Procedures, Therapies)   2017                    Hospital Admission Date: 2017  AMARJIT RUEDA   : 1915  Sex: Female        Patient PCP Information     Provider PCP Type    Kathy Pratt General      History & Physicals     No notes of this type exist for this encounter.         Discharge Summaries      Discharge Summaries by Eliseo Douglass DO at 2017 12:04 PM     Author:  Eliseo Douglass DO Service:  Hospitalist Author Type:  Physician    Filed:  2017 12:08 PM Date of Service:  2017 12:04 PM Creation Time:  2017 12:04 PM    Status:  Signed :  Eliseo Douglass DO (Physician)         [facility]    Discharge Summary  Hospitalist    Date of Admission:  2017  Date of Discharge:  2017  Discharging Provider: Eliseo Douglass  Date of Service (when I saw the patient): 17    Discharge Diagnoses   Other acute pulmonary embolism (H)    History of Present Illness   History is obtained from patient's son and daughter-in-law and also discussed with the ED physician.  The patient is minimally contributing to the history as she is dozing off in between.         Amarjit Rueda is a 89-year-old female with a past medical history of diastolic dysfunction, hyperlipidemia, history of TIA, left-sided breast cancer status post mastectomy who is presenting from her independent living facility due to right-sided chest pain and shortness of breath.  Her son reports that yesterday he was walking her to the dining area and she appeared somewhat short of breath and needed a break to get to the dining area which is not typical for her.  In addition, he reports she complained of right-sided chest pain.  Today the assisted living facility called her family and reported that the patient is short of  breath and is having right-sided chest pain.  As a result, she was sent to the emergency room.  Physically, she reports she does not have any pain and does not really interact much as she is dozing off.  There is no report of fever or chills that the family is aware of.  The patient generally is not active and has a sedentary lifestyle.  Therefore, she is usually sitting at a kitchen chair and does not do much activity, but she does use a walker to get around in the facility.  Per family, the patient has been generally weak over the last 1 month and minimally active.       In the emergency room, the patient was evaluated by Dr. Bernard.  Her vital signs showed a temperature of 99.3, pulse in the 70s, blood pressure ranging from -teens. She is saturating 99% on 2 liters nasal cannula though no hypoxia is documented.  Her laboratory was notable for creatinine of 1.29 and BUN of 42.  CBC showed WBC of 15.8, hemoglobin of 11.4.  Troponin was negative.  N-terminal proBNP was in the normal range.  The D-dimer was elevated to 2.2.  Chest CT was obtained which showed a right-sided acute pulmonary emboli.  There were also innumerable small bilateral pulmonary nodules, most indeterminate in nature, measuring up to 1 cm and pulmonary mets not excluded.  Given her acute pulmonary embolism, admission was requested for further management.     Hospital Course   Christy Rueda was admitted on 6/1/2017.  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      Acute right-sided pulmonary emboli Presented with right-sided  chest pain and shortness of breath. Generally has a sedentary lifestyle with further decrease in activity recently. Also with history of breast cancer s/p mastectomy and chemotherapy ~15-20 years ago, now with innumerable pulmonary nodules as noted below, can not rule out metastatic disease, may contribute to propensity for clotting. Risks/benefits conversation regarding anticoagulation held during admission. Family agreeable to anticoagulation. LE US negative for DVT. Discussed with family echocardiogram as part of typical PE work-up. As LE US negative for DVT would be unlikely to pursue IVC filter placement with any RV dysfunction, especially given her age and questionable utility of this intervention when anticoagulation is not contraindicated. Family agreeable to forgo. Note troponin negative, NtpBNP within referance range.  - Warfarin, pharm to dose.   - Plan for indefinite anticoagulation, however can be reassessed if she has high risks events such as falls at home that make risk of bleeding unacceptably high, hopefully after at least a 3 month course.       Innumerable bilateral pulmonary nodules Indeterminate in nature on CT. Can not exclude metastatic disease, does have history of breast cancer.  - Per report, discussed with family. At this point would not pursue any systemic treatment if diagnosed with metastatic disease, agreeable to forgo any additional testing or serial imaging as will not .      Pulmonary edema Noted to require oxygen overnight, and more consistent oxygen needs 6/4/2017. CXR with diffuse bilateral pulmonary opacities suggestive of edema. May be iatrogenic in nature secondary to previous IV fluids, no other clear provocative factors. Last echocardiogram in 2013 with EF 65-70% with moderate diastolic dysfunction. Have deferred repeat echocardiogram for PE as discussed above.  - Wean oxygen as able      Acute kidney injury, suspect pre-renal Creatinine is mildly elevated  at 1.29 on admission from baseline around 1.1. BUN elevated as well, >20:1.   - Creatinine improved       Nausea Noted after lunch 6/3/2017. Per family, she has eaten significantly more 6/3/17 than is her usual. Having BM. Non-tender on exam.  - Resolved 6/4/2017      Abdominal tenderness Noted on admission, none present at this time. LFTs unremarkable. Unclear etiology, but appears to have resolved.  - Monitor, resolved.       Leukocytosis, Pyuria LFTs unremarkable. UA mildly abnormal, difficult to interpret in setting of chronic catheterization. UC with 10-50,000 E coli.   - Without suprapubic tenderness or other systemic signs of infection such as fever.   - Monitor.      Hyperlipidemia: Stable.   - Continue prior to admission statin.       Hypertension Blood pressure adequately controlled.   - Continue prior to admission metoprolol, amlodipine.       History of TIA: Aspirin has been discontinued given initiation of formal anticoagulation   - Monitor.      Chronic pain: Stable.   - Continue prior to admission acetaminophen twice daily and as needed.       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   - Continue Hidalgo catheter      Advanced care planning Introduced role of hospice to family. Discussed present plan for discharge to TCU and rehabilitation, but encouraged them to discuss at any time    Pending Results   These results will be followed up by PCP  Unresulted Labs Ordered in the Past 30 Days of this Admission     No orders found from 4/2/2017 to 6/2/2017.          Code Status   DNR / DNI       Primary Care Physician   Kathy Pratt    Physical Exam   Temp: 99  F (37.2  C) Temp src: Axillary BP: 153/76 Pulse: 69 Heart Rate: 73 Resp: 18 SpO2: 92 % O2 Device: Nasal cannula Oxygen Delivery: 1 LPM  Vitals:    06/04/17 0548 06/05/17 0500 06/06/17 0643   Weight: 51.5 kg (113 lb 8.6 oz) 52.7 kg (116 lb 2.9 oz) 52.4 kg (115 lb 8.3 oz)     Vital Signs  with Ranges  Temp:  [97.6  F (36.4  C)-99  F (37.2  C)] 99  F (37.2  C)  Pulse:  [69] 69  Heart Rate:  [73-77] 73  Resp:  [16-19] 18  BP: (136-153)/(75-76) 153/76  SpO2:  [85 %-95 %] 92 %  I/O last 3 completed shifts:  In: 160 [P.O.:160]  Out: 1000 [Urine:1000]    GENERAL: Alert and oriented. NAD. Conversational, appropriate. Hard of hearing. Pleasant.   HEENT: Normocephalic. EOMI. No icterus or injection. Nares normal. NC in place.   LUNGS: Decrement bilaterally, improved. No dyspnea at rest.   HEART: Regular rate. Extremities perfused.   ABDOMEN: Soft, nontender, and nondistended. Positive bowel sounds.   EXTREMITIES: LE edema noted.   NEUROLOGIC: Moves extremities x4. No acute focal neurologic abnormalities noted.     Discharge Disposition   Discharged to TCU  Condition at discharge: Stable    Consultations This Hospital Stay   PHARMACY TO DOSE HEPARIN  SOCIAL WORK IP CONSULT  PHYSICAL THERAPY ADULT IP CONSULT  OCCUPATIONAL THERAPY ADULT IP CONSULT  PHARMACY TO DOSE HEPARIN  PHARMACY TO DOSE WARFARIN  PALLIATIVE CARE ADULT IP CONSULT  PHYSICAL THERAPY ADULT IP CONSULT  OCCUPATIONAL THERAPY ADULT IP CONSULT    Time Spent on this Encounter   I, Eliseo Douglass, personally saw the patient today and spent greater than 30 minutes discharging this patient.    Discharge Orders     General info for SNF   Length of Stay Estimate: Short Term Care: Estimated # of Days <30  Condition at Discharge: Improving  Level of care:skilled   Rehabilitation Potential: Fair  Admission H&P remains valid and up-to-date: Yes  Recent Chemotherapy: N/A  Use Nursing Home Standing Orders: Yes     Mantoux instructions   Give two-step Mantoux (PPD) Per Facility Policy Yes     Reason for your hospital stay   Pulmonary embolism     Follow Up and recommended labs and tests   Follow up with [facility] physician.  The following labs/tests are recommended: cbc/bmp/inr.     Activity - Up with nursing assistance     DNR/DNI     Physical Therapy  Adult Consult   Evaluate and treat as clinically indicated.    Reason:  Physical deconditioning.     Occupational Therapy Adult Consult   Evaluate and treat as clinically indicated.    Reason:  Physical deconditioning.     Oxygen - Nasal cannula   1 Lpm by nasal cannula to keep O2 sats 92% or greater.     Fall precautions     Advance Diet as Tolerated   Follow this diet upon discharge: Orders Placed This Encounter     Combination Diet Regular Diet Adult       Discharge Medications   Current Discharge Medication List      START taking these medications    Details   warfarin (COUMADIN) 1 MG tablet Take 1 tablet (1 mg) by mouth daily  Qty: 30 tablet, Refills: 0    Associated Diagnoses: Other acute pulmonary embolism (H)         CONTINUE these medications which have CHANGED    Details   traMADol (ULTRAM) 50 MG tablet Take 1 tablet (50 mg) by mouth every 6 hours as needed  Qty: 28 tablet, Refills: 0    Associated Diagnoses: Other acute pulmonary embolism (H)         CONTINUE these medications which have NOT CHANGED    Details   multivitamin, therapeutic with minerals (MULTI-VITAMIN) TABS tablet Take 1 tablet by mouth daily      !! Acetaminophen (TYLENOL PO) Take 500 mg by mouth daily as needed for mild pain or fever At MN if needed      !! DOCUSATE SODIUM PO Take 100-400 mg by mouth as needed for constipation      polyethylene glycol (MIRALAX/GLYCOLAX) packet Take 1 packet by mouth Every Mon, Wed, Fri Morning       BISACODYL RE Place 1 suppository rectally daily as needed      psyllium (METAMUCIL/KONSYL) packet Take 1 packet by mouth daily       !! Acetaminophen (TYLENOL PO) Take 1,000 mg by mouth 2 times daily       AmLODIPine Besylate (NORVASC PO) Take 2.5 mg by mouth daily       VITAMIN D, CHOLECALCIFEROL, PO Take 1,000 Units by mouth daily      OMEPRAZOLE PO Take 20 mg by mouth daily.      ATORVASTATIN CALCIUM PO Take 10 mg by mouth At Bedtime.      METOPROLOL SUCCINATE PO Take 50 mg by mouth daily        Levothyroxine Sodium (LEVOXYL PO) Take 88 mcg by mouth daily.      !! DOCUSATE SODIUM PO Take 300 mg by mouth every evening        !! - Potential duplicate medications found. Please discuss with provider.      STOP taking these medications       CIPROFLOXACIN PO Comments:   Reason for Stopping:         sodium phosphate (FLEET ENEMA) 7-19 GM/118ML rectal enema Comments:   Reason for Stopping:         HYDROcodone-acetaminophen (VICODIN) 5-500 MG per tablet Comments:   Reason for Stopping:         aspirin 81 MG EC tablet Comments:   Reason for Stopping:         LOSARTAN POTASSIUM PO Comments:   Reason for Stopping:             Allergies   Allergies   Allergen Reactions     Demerol Nausea and Vomiting     Levaquin      Morphine      Thinks nausea and vomiting with morphine but is not positive     Penicillins Nausea and Vomiting     Data   Most Recent 3 CBC's:  Recent Labs   Lab Test  06/06/17   0815  06/03/17   0814  06/02/17   0544   WBC  4.3  9.7  17.2*   HGB  10.6*  10.7*  10.5*   MCV  105*  102*  102*   PLT  235  238  207      Most Recent 3 BMP's:  Recent Labs   Lab Test  06/06/17   0815  06/05/17   1215  06/05/17   0809   06/03/17   0814   NA  144  144  146*   < >  145*   POTASSIUM  4.5   --   4.2   --   4.2   CHLORIDE  109   --   112*   --   112*   CO2  31   --   32   --   25   BUN  16   --   22   --   23   CR  0.70   --   0.85   --   0.74   ANIONGAP  4   --   2*   --   8   NAJMA  9.5   --   9.4   --   9.2   GLC  85   --   86   --   86    < > = values in this interval not displayed.     Most Recent 2 LFT's:  Recent Labs   Lab Test  06/02/17   0544  09/25/16   1015   AST  14  19   ALT  15  18   ALKPHOS  56  64   BILITOTAL  0.3  0.3     Most Recent INR's and Anticoagulation Dosing History:  Anticoagulation Dose History     Recent Dosing and Labs Latest Ref Rng & Units 6/18/2013 6/1/2017 6/2/2017 6/3/2017 6/4/2017 6/5/2017 6/6/2017    Warfarin 2.5 mg - - 2.5 mg 2.5 mg 2.5 mg 2.5 mg - -    INR 0.86 - 1.14 1.04 1.05  1.30(H) 1.50(H) 1.88(H) 2.95(H) 2.82(H)        Most Recent 3 Troponin's:  Recent Labs   Lab Test  06/01/17   1059  09/25/16   1015  06/18/13   1548   TROPI  <0.015  The 99th percentile for upper reference range is 0.045 ug/L.  Troponin values in   the range of 0.045 - 0.120 ug/L may be associated with risks of adverse   clinical events.    <0.015  The 99th percentile for upper reference range is 0.045 ug/L.  Troponin values in   the range of 0.045 - 0.120 ug/L may be associated with risks of adverse   clinical events.    <0.012     Most Recent Cholesterol Panel:  Recent Labs   Lab Test  06/19/13   0800   CHOL  156   LDL  87   HDL  59   TRIG  52     Most Recent 6 Bacteria Isolates From Any Culture (See EPIC Reports for Culture Details):  Recent Labs   Lab Test  06/01/17   1920  09/25/16   1240  09/25/16   1015  03/13/11   1650   CULT  10,000 to 50,000 colonies/mL Escherichia coli*  >100,000 colonies/mL Escherichia coli*  No growth  >100,000 colonies/mL Escherichia coli     Most Recent TSH, T4 and A1c Labs:  Recent Labs   Lab Test  06/18/13   1440   TSH  0.60     Results for orders placed or performed during the hospital encounter of 06/01/17   XR Chest 2 Views    Narrative    XR CHEST 2 VW 6/1/2017 11:18 AM    COMPARISON: 9/25/2016    HISTORY: Dyspnea      Impression    IMPRESSION: Enlarged cardiac silhouette is again seen and unchanged.  Mild left basilar atelectasis/consolidation. No significant pleural  effusion on either side. No pneumothorax. Wedge compression deformity  in the upper thoracic spine is again seen and unchanged.    BRYNN BOCANEGRA   Chest CT, IV contrast only - PE protocol     Value    Radiologist flags Acute pulmonary emboli. (AA)    Narrative    CT CHEST PULMONARY EMBOLISM WITH CONTRAST  6/1/2017 1:11 PM     HISTORY:   Dyspnea, chest pain and elevated D-dimer.    TECHNIQUE:    Helical axial scans from lung apices through lung bases  with 53 mL Isovue-370 IV contrast. Radiation dose for this scan  was  reduced using automated exposure control, adjustment of the mA and/or  kV according to patient size, or iterative reconstruction technique.    COMPARISON:    None.    FINDINGS:    There is thrombus within the right descending pulmonary  artery and adjacent multiple segmental branches consistent with acute  pulmonary embolism. Additional pulmonary emboli are seen in the right  middle lobe pulmonary arterial branches with minimal extension of one  of the thrombi into an upper lobe pulmonary artery. No left-sided  pulmonary emboli.    Vascular calcifications including coronary artery calcifications.  Tortuosity of the thoracic aorta with no CT evidence for aortic  dissection. Calcified lymph nodes left hilum consistent with old  granulomatous disease.    There are innumerable pulmonary nodules scattered in the lungs  bilaterally, largest measuring up to 1 cm. A few of these are  calcified but most are noncalcified and are indeterminate. This could  represent metastatic disease and clinical correlation is recommended.  Mild scattered platelike atelectasis posterior lung bases. No  acute-appearing confluent infiltrates.    Visualized upper abdomen shows cholelithiasis. Metallic artifact from  spinal fusion hardware obscures much of the included abdomen.      Impression    IMPRESSION:  1. Right-sided acute pulmonary emboli.  2. Vascular calcifications and tortuous thoracic aorta.  3. Old granulomatous disease left hilum.  4. Innumerable small bilateral pulmonary nodules, most indeterminate  in nature, measuring up to 1 cm. Pulmonary metastases are not  excluded.  5. Cholelithiasis.      [Critical Result: Acute pulmonary emboli.]    Finding was identified on 6/1/2017 1:16 PM.     Dr. Bernard was contacted by me on 6/1/2017 1:28 PM and verbalized  understanding of the critical result.     SHANNAN FERRARA MD   US Lower Extremity Venous Duplex Bilateral    Narrative    VENOUS ULTRASOUND BILATERAL LOWER EXTREMITY  6/1/2017   4:54 PM     HISTORY: Swelling.    COMPARISON: None.    FINDINGS: Examination of the deep veins with graded compression and  color flow Doppler with spectral wave form analysis shows no evidence  of thrombus in the common femoral vein, femoral vein, popliteal vein  or calf veins. There is no venous insufficiency. Exam is mildly  limited by calf edema.      Impression    IMPRESSION: No evidence of deep venous thrombosis. Exam is mildly  limited by calf edema.    MARIS MONDRAGON MD   XR Chest 2 Views    Narrative    XR CHEST 2 VW 6/4/2017 1:16 PM    HISTORY: Hypoxia.    COMPARISON: June 1, 2017.      Impression    IMPRESSION: There are diffuse bilateral pulmonary opacities,  suggestive of edema, with likely small bilateral effusions. No  pneumothorax appreciated. Cardiomegaly as before. Lateral views  demonstrate multiple wedge compression deformities in the upper  thoracic spine as before.    HERMINIO ALVAREZ MD   XR Chest 1 View    Narrative    CHEST ONE VIEW UPRIGHT  6/5/2017 2:35 PM     HISTORY: Interval.    COMPARISON: 6/4/2017.      Impression    IMPRESSION: Stable interstitial edema and/or fibrosis since the  comparison study. No new infiltrates.    MARVA LEVIN MD[ZA1.1]          Revision History        User Key Date/Time User Provider Type Action    > ZA1.1 6/6/2017 12:08 PM Eliseo Douglass DO Physician Sign                     Consult Notes      Consults by Jackie Watkins APRN CNP at 6/6/2017  1:51 PM     Author:  Jackie Watkins APRN CNP Service:  Palliative Author Type:  Nurse Practitioner    Filed:  6/6/2017  2:15 PM Date of Service:  6/6/2017  1:51 PM Creation Time:  6/6/2017  1:28 PM    Status:  Signed :  Jackie Watkins APRN CNP (Nurse Practitioner)     Consult Orders:    1. Palliative Care Adult IP Consult: goals of care.; Consultant may enter orders: Yes; Patient to be seen: Routine - within 24 hours [[id number]] ordered by Eliseo Douglass DO at 06/05/17 1123                 [facility]    Palliative Care Consultation     Christy Rueda  MRN# [id number]  Date of Admission:  2017  Date of Service (when I saw the patient):[AW1.1] 17[AW1.2]  Reason for consult: Consulted by Dr. Douglass for Goals of care[AW1.1]    Assessment & Plan[AW1.2]   Christy Rueda is a 89 year old female with PMH significant for diastolic dysfunction, HTN, HLD, TIA, left breast CA s/p mastectomy, thoracic and lumbar compression fractures, and chronic indwelling franks catheter who presents with SOB and chest wall pain. She was noted to have an acute right pulmonary embolism. She has been started on anticoagulation (lovenox as a bridge to coumadin). She was noted to have mild RAPHAEL and pulmonary edema. Her condition is stable. We are being consulted for goals of care.     Symptoms/Recommendations   -TCU is a reasonable discharge plan. Family intends to give it some time to see if pt can rehabilitate and get back into her assisted living apartment. If she is unsuccessful with rehabilitation, family wants to initiate hospice   -Hospice consultation (informational) prior to discharge or in the next few days at [facility]; unit [facility] to assist with setting this up   -We updated her POLST form which designates DNR/DNI, comfort measures with hope and plan to not rehospitalize if possible[AW1.1].[AW1.3] When she decompensates in the future, family plans to initiate hospice     Support/Coping  -6 adult children, 1 is . Son Oneil is the most supportive and present today. His wife Fozia is also supportive. Only one other child lives local, but she is estranged from Susanna. Other adult children do not live local   -Pt is Hinduism     Decisional Support, Goals of Care, Counseling & Coordination  Decisional Capacity Intact?  -No  Health Care Directive on File?  -POLST reviewed[AW1.1]. A new one was[AW1.3] updated today   Code Status/Resuscitation Preferences?  -DNR/DNI      Discussion  Visited with pt, son Oneil, and KURT Marcelo this afternoon. Pt is sitting up in the chair. She is not comfortable in the chair and wants to get back in bed. Her back is quite contracted from hx compression fractures. This makes it hard for her breathe comfortably. Family feels she is quite sleepy and withdrawn today, which is new for her. Family thus asked to meet in private.    Son Oneil, KURT Marcelo and I thus met in a private space. I introduced the scope of our practice to Lakshmi. Discussed our potential roles for symptom management, support/coping, and decisional support (aka goals of care). They wonder what the difference is between palliative care and hospice. I share with them that palliative care can follow along one's journey with chronic illness, whether goals are to restore health or not. Hospice is generally reserved for those not seeking life prolonging treatments for their disease, and generally for end of life.     They have many questions about plan of care options, TCU vs hospice, and medicare payment.     Overall, they have a good understanding of Susanna's current condition, which is that she presented with a new blood clot, which is being treated with blood thinners. They also understand she has new lung nodules, which may represent cancer. It is not important for them to find out if that is the case.     Overall, Cans health has been declining in the last 6 months. She had a hospitalization in 2/2017 for unresponsiveness, thought to be 2/2 dehydration. She has been weaker in recent months. It is becoming more challenging for her to walk to the dining mays. She sleeps a lot. She also has been falling. It has always been important to her, and them, and very comforting for her to be in her own apartment.     We talk about the need for anticoagulation and the risk/benefits in setting of falls/weakness. Without anticoagulation, I worry that her PE would cause worsening symptoms (SOB,  "respiratory failure) and could be quite fatal (possibly within days to weeks). With anticoagulation, we certainly do worry about her weakness and risk for falling.    Family cannot recall talking to Susanna about death and dying. Nevertheless, they understand that purely based on her age, time to live is likely short, nobody knows exactly. Adding on her medical issues certainly complicates this. They do believe her quality of life continues to be reasonable. They are very loving and supportive to her. She has not been talking about being unhappy or wanting to die. We do talk about the importance of exploring this further with Susanna.     We talk about options moving forward. Oneil is very sure that trying TCU is the best option at this time. He hopes this may restore some strength and possibly give Susanna the opportunity to return to her apartment. Without trying TCU, he knows she would not be strong enough to get back to her apartment. And he does not want to not try.     It is possible that Susanna does not want to rehabilitate, or that her body may not be capable of getting stronger from here. Oneil is worried about her present state today, he worries that this may be the beginning of the end. I do agree that time will tell.     Hospice would be a very reasonable option in the near future, if things at TCU do not go well. I educated family regarding hospice philosophy and prognostic criteria. Dispelled common myths. Discussed what hospice is (and is not), what services are usually provided (and those that are not, ex \"correction care\"), under what circumstances people tend to enroll, and the variety of places people can get hospice care (along with subsequent financial implications).     Family ultimately decides that trying TCU makes the most sense. Pending her ability to rehab, or should she not reach her goals, they could enroll in hospice at any point.[AW1.1] I believe she is eligible for hospice.[AW1.3]     I recommend an " informational hospice consultation (no need to sign up now) as soon as today before discharge, or within the next few days. Therefore, when they are ready for hospice, they simply need to call a phone # to set it up. Family agrees with this plan.[AW1.1]     Family do not feel that rehospitalizing Susanna makes sense moving forward. The next time she gets sick, they want to keep her at [facility] and enroll in hospice (if she is not already enrolled). I thus recommend that a POLST form be updated, which they are agreeable to.[AW1.3]     I did revisit with Susanna again to assess her understanding of her health and discuss her hopes and fears. However, she is too lethargic to participate. I did share with her that we will attempt rehabilitation to see if that allows her to get back into her apartment.[AW1.1] She is largely agreeable to this. I did share with her that if she reaches a point where she cannot participate in activity, or if she does not want to, that it is ok to let staff know and to stop. I share with her that there are ways to ensure she is comfortable. She dozes off during this discussion and is unable to talk further about her hopes and concerns.     Family and I thus work on a POLST. We designate DNR/DNI, comfort care with plan to not rehospitalize or come to the ED, if at all possible. They want to offer food and liquid by mouth, but not consider a feeding tube. They want to offer oral abx and IVF, if it will provide comfort and help allow for a bit more time to notify family to come say goodbye.     Case and plan of care reviewed with unit SATHYA Gayle. Pt will discharge to [facility] TCU today.[AW1.3]     Thank you for involving us in the care of this patient and family. We will sign off at this time. Please do not hesitate to contact me with questions or concerns or the on-call provider for our team if evening or weekend.    Margot FREED, CNP  Palliative Medicine   Pager  [phone]    Attestation:  Total time on the floor involved in the patient's care:[AW1.1] 120[AW1.3] minutes[AW1.1] (only 30 minutes spent in pt's room)[AW1.3]  Total time spent in counseling/care coordination: >50%[AW1.1]    Chief Complaint[AW1.2]   SOB, chest wall pain[AW1.3]     History is obtained from the patient, staff,[AW1.1] family[AW1.3] and extensive chart review.[AW1.1]     Past Medical History[AW1.2]    I have reviewed this patient's medical history and updated it with pertinent information if needed.[AW1.1]   Past Medical History:   Diagnosis Date     Cancer (H)     breast     Congestive heart failure (H)      High cholesterol      Hypertension      LFT elevation        Past Surgical History[AW1.2]   I have reviewed this patient's surgical history and updated it with pertinent information if needed.[AW1.1]  Past Surgical History:   Procedure Laterality Date     BACK SURGERY       BREAST SURGERY      lumpectomy      GYN SURGERY      hysterectomy, c- section      HEAD & NECK SURGERY      thyroidectomy      ORTHOPEDIC SURGERY      TKA        Social History[AW1.2]   Living situation:[AW1.1] [facility][AW1.3]    Family system:[AW1.1] 6 adult children, son Oneil and DIL Fozia most supportive. 1 other child lives local but she is estranged. Other adult children are not local[AW1.3]     Self-identified support system:[AW1.1] Lakshmi, friends at [facility][AW1.3]    Employment/education:[AW1.1] [employer][AW1.3]    Activities/interests:[AW1.1] ND[AW1.3]    Use of community resources:[AW1.1] None[AW1.3]     Church affiliation:[AW1.1] [employer][AW1.3]     Involvement in elsy community:[AW1.1] [place][AW1.3]    Impact of illness on patient:[AW1.1] Pt is nearing the end of her life (89) and has chronic medical issues, time to live is likely shortened by both of these factors[AW1.3]     Family History[AW1.2]   I have reviewed this patient's family history and updated it with pertinent information if needed.[AW1.1]    No family history on file.    Allergies   Allergies   Allergen Reactions     Demerol Nausea and Vomiting     Levaquin      Morphine      Thinks nausea and vomiting with morphine but is not positive     Penicillins Nausea and Vomiting       Medications   Current Facility-Administered Medications Ordered in Epic   Medication Dose Route Frequency Last Rate Last Dose     warfarin (COUMADIN) tablet 1 mg  1 mg Oral ONCE at 18:00         lidocaine 1 % 1 mL  1 mL Other Q1H PRN         lidocaine (LMX4) cream   Topical Q1H PRN         sodium chloride (PF) 0.9% PF flush 3 mL  3 mL Intracatheter Q1H PRN   3 mL at 06/04/17 1442     sodium chloride (PF) 0.9% PF flush 3 mL  3 mL Intracatheter Q8H   3 mL at 06/05/17 0600     acetaminophen (TYLENOL) tablet 1,000 mg  1,000 mg Oral BID   1,000 mg at 06/06/17 0907     amLODIPine (NORVASC) tablet 2.5 mg  2.5 mg Oral Daily   2.5 mg at 06/06/17 0907     atorvastatin (LIPITOR) tablet 10 mg  10 mg Oral At Bedtime   10 mg at 06/05/17 2101     docusate sodium (COLACE) capsule 300 mg  300 mg Oral QPM   300 mg at 06/05/17 2008     levothyroxine (SYNTHROID/LEVOTHROID) tablet 88 mcg  88 mcg Oral Daily   88 mcg at 06/06/17 0906     metoprolol (TOPROL-XL) 24 hr tablet 50 mg  50 mg Oral Daily   50 mg at 06/06/17 0906     omeprazole (priLOSEC) CR capsule 20 mg  20 mg Oral Daily   20 mg at 06/06/17 0907     polyethylene glycol (MIRALAX/GLYCOLAX) Packet 17 g  17 g Oral Q Mon Wed Fri AM   17 g at 06/05/17 0935     psyllium (METAMUCIL/KONSYL) Packet 1 packet  1 packet Oral Daily   1 packet at 06/06/17 0907     naloxone (NARCAN) injection 0.1-0.4 mg  0.1-0.4 mg Intravenous Q2 Min PRN         Patient is already receiving anticoagulation with heparin, enoxaparin (LOVENOX), warfarin (COUMADIN)  or other anticoagulant medication   Does not apply Continuous PRN         acetaminophen (TYLENOL) tablet 325 mg  325 mg Oral Q8H PRN         bisacodyl (DULCOLAX) Suppository 10 mg  10 mg Rectal Daily PRN          ondansetron (ZOFRAN-ODT) ODT tab 4 mg  4 mg Oral Q6H PRN   4 mg at 06/05/17 2119    Or     ondansetron (ZOFRAN) injection 4 mg  4 mg Intravenous Q6H PRN         prochlorperazine (COMPAZINE) injection 5 mg  5 mg Intravenous Q6H PRN        Or     prochlorperazine (COMPAZINE) tablet 5 mg  5 mg Oral Q6H PRN        Or     prochlorperazine (COMPAZINE) Suppository 12.5 mg  12.5 mg Rectal Q12H PRN         Warfarin Therapy Reminder (Check START DATE - warfarin may be starting in the FUTURE)  1 each Does not apply Continuous PRN         Current Outpatient Prescriptions Ordered in [facility]   Medication     traMADol (ULTRAM) 50 MG tablet     warfarin (COUMADIN) 1 MG tablet       Review of Systems[AW1.2]   The comprehensive review of systems is negative other than noted here and in the assessment/plan.    Palliative Symptom Review (0=no symptom/no concern, 1=mild, 2=moderate, 3=severe):  Pain:[AW1.1] 0-1[AW1.3]  Fatigue:[AW1.1] 2[AW1.3]  Nausea:[AW1.1] 0[AW1.3]  Constipation:[AW1.1] 0[AW1.3]  Diarrhea:[AW1.1] 0[AW1.3]  Drowsiness:[AW1.1] 1[AW1.3]  Poor Appetite:[AW1.1] 0[AW1.3]  Shortness of Breath:[AW1.1] 1[AW1.3]    Physical Exam   Temp: 99  F (37.2  C) Temp src: Axillary BP: 153/76 Pulse: 69 Heart Rate: 73 Resp: 18 SpO2: 95 % O2 Device: Nasal cannula Oxygen Delivery: 1.5 LPM  Vitals:    06/04/17 0548 06/05/17 0500 06/06/17 0643   Weight: 51.5 kg (113 lb 8.6 oz) 52.7 kg (116 lb 2.9 oz) 52.4 kg (115 lb 8.3 oz)[AW1.2]     CONSTITUTIONAL:[AW1.1] Chronically ill elderly woman seen sitting up in the chair in mild distress[AW1.3],[AW1.1] she is a bit lethargic/somnolent, but does respond to verbal cue. She is [place]. She is calm. Family present.[AW1.3]   HEENT: NCAT  RESPIRATORY: NL respiratory effort on[AW1.1] 1L via NC[AW1.3]  MUSCULOSKELETAL:[AW1.1] Spine is hunched and contracted[AW1.3]     Data   Results for orders placed or performed during the hospital encounter of 06/01/17 (from the past 24 hour(s))   XR Chest 1 View    Narrative     CHEST ONE VIEW UPRIGHT  6/5/2017 2:35 PM     HISTORY: Interval.    COMPARISON: 6/4/2017.      Impression    IMPRESSION: Stable interstitial edema and/or fibrosis since the  comparison study. No new infiltrates.    MARVA LEVIN MD   INR   Result Value Ref Range    INR 2.82 (H) 0.86 - 1.14   Basic metabolic panel   Result Value Ref Range    Sodium 144 133 - 144 mmol/L    Potassium 4.5 3.4 - 5.3 mmol/L    Chloride 109 94 - 109 mmol/L    Carbon Dioxide 31 20 - 32 mmol/L    Anion Gap 4 3 - 14 mmol/L    Glucose 85 70 - 99 mg/dL    Urea Nitrogen 16 7 - 30 mg/dL    Creatinine 0.70 0.52 - 1.04 mg/dL    GFR Estimate 76 >60 mL/min/1.7m2    GFR Estimate If Black >90   GFR Calc   >60 mL/min/1.7m2    Calcium 9.5 8.5 - 10.1 mg/dL   CBC with platelets   Result Value Ref Range    WBC 4.3 4.0 - 11.0 10e9/L    RBC Count 3.26 (L) 3.8 - 5.2 10e12/L    Hemoglobin 10.6 (L) 11.7 - 15.7 g/dL    Hematocrit 34.1 (L) 35.0 - 47.0 %     (H) 78 - 100 fl    MCH 32.5 26.5 - 33.0 pg    MCHC 31.1 (L) 31.5 - 36.5 g/dL    RDW 14.4 10.0 - 15.0 %    Platelet Count 235 150 - 450 10e9/L[AW1.2]                    Revision History        User Key Date/Time User Provider Type Action    > AW1.3 6/6/2017  2:15 PM Jackie Watkins APRN CNP Nurse Practitioner Sign     AW1.2 6/6/2017  1:29 PM Jackie Watkins APRN CNP Nurse Practitioner      AW1.1 6/6/2017  1:28 PM Jackie Watkins APRN CNP Nurse Practitioner                      Progress Notes - Physician (Notes from 06/03/17 through 06/06/17)      Progress Notes by Syeda Londono RN at 6/6/2017  4:59 PM     Author:  Bry, Syeda, RN Service:  Hospice Author Type:  Registered Nurse    Filed:  6/6/2017  5:05 PM Date of Service:  6/6/2017  4:59 PM Creation Time:  6/6/2017  4:59 PM    Status:  Signed :  Syeda Londono RN (Registered Nurse)          Hospice: Was asked to met with family to explain hospice services. Met with pts son Efrain and his wife in conference  room on station 66. Pt will be discharging to TCU at [facility] and family wanting hospice information so they can make the best decision after the TCU ends. I explained the hospice philosophy of care, the medicare benefit, services available for support, medication and equipment coverage. The family is not sure whether the pt will stay in the care center or go back to her AL apt when she is done with TCU. I explained how the hospice staff would collaborate with the facility staff and if she were to go back to her own apt how we would collaborate with the 24/7 staff they hire for her. I left them with the hospice information sheet with my phone umber for questions and the hospice care navigator number for when they are ready to sign into the hospice program. Thank you for the referral.Syeda Londono RN, BSN  [facility] Admission nurse  [phone][HR1.1]     Revision History        User Key Date/Time User Provider Type Action    > HR1.1 6/6/2017  5:05 PM Syeda Londono, RN Registered Nurse Sign            Progress Notes by Irwin Srivastava at 6/6/2017  2:03 PM     Author:  Irwin Srivastava Service:  Social Work Author Type:      Filed:  6/6/2017  2:13 PM Date of Service:  6/6/2017  2:03 PM Creation Time:  6/6/2017  2:03 PM    Status:  Signed :  Irwin Srivastava ()         SATHYA MONTANEZ MD has completed D/C orders for TCU placement. Patient was accepted at [facility]'s TCU as she is from their housing.  I) Spoke with son, Oneil who requests transport. Noted oxygen is needed and patient will be billed for both. Arranged [facility] portable tank delivery and faxed orders and face sheet. Set up [facility] wheelchair ride at 1730. Garrett requests a private room if available. Left Rodríguez at [facility] a voice mail message requesting this. Faxed orders and scripts. Spoke with NP with Palliative Care who states family is interested in an informational hospice meeting, if this can be  arranged prior to D/C. She notes family and patient agree to a TCU stay now, but want hospice information for the future. Spoke with Syeda with [facility] and she will see family around 1630 in patient's room.  A) Family and patient agree to this plan.  P) D/C to TCU at [facility].[RH1.1]     Revision History        User Key Date/Time User Provider Type Action    > RH1.1 6/6/2017  2:13 PM Irwin Srivastava  Sign            Progress Notes by Eliseo Douglass DO at 6/6/2017  9:35 AM     Author:  Eliseo Douglass DO Service:  Hospitalist Author Type:  Physician    Filed:  6/6/2017 11:58 AM Date of Service:  6/6/2017  9:35 AM Creation Time:  6/6/2017  9:35 AM    Status:  Signed :  Eliseo Douglass DO (Physician)         [facility]  Hospitalist Progress Note        Eliseo Douglass DO  06/06/2017        Interval History:[ZA1.1]      Patient resting comfortably today. Plan for informational meeting with palliative today at family request.[ZA1.2]          Assessment and Plan:[ZA1.1]        Christy Rueda is a 89 year-old female with a past medical history of hypertension, hyperlipidemia, transient ischemic attack, history of left-sided breast cancer status post mastectomy, chronic Hidalgo catheter due to urinary retention and hypothyroidism who presents with shortness of breath and right-sided chest pain admitted on 6/1/2017 after being diagnosed with pulmonary embolism.      Acute right-sided pulmonary emboli Presented with right-sided chest pain and shortness of breath. Generally has a sedentary lifestyle with further decrease in activity recently. Also with history of breast cancer s/p mastectomy and chemotherapy ~15-20 years ago, now with innumerable pulmonary nodules as noted below, can not rule out metastatic disease, may contribute to propensity for clotting. Risks/benefits conversation regarding anticoagulation held during admission. Family  agreeable to anticoagulation. LE US negative for DVT. Discussed with family echocardiogram as part of typical PE work-up. As LE US negative for DVT would be unlikely to pursue IVC filter placement with any RV dysfunction, especially given her age and questionable utility of this intervention when anticoagulation is not contraindicated. Family agreeable to forgo. Note troponin negative, NtpBNP within referance range.  - Warfarin, pharm to dose.   - Plan for indefinite anticoagulation, however can be reassessed if she has high risks events such as falls at home that make risk of bleeding unacceptably high, hopefully after at least a 3 month course.       Innumerable bilateral pulmonary nodules Indeterminate in nature on CT. Can not exclude metastatic disease, does have history of breast cancer.  - Per report, discussed with family. At this point would not pursue any systemic treatment if diagnosed with metastatic disease, agreeable to forgo any additional testing or serial imaging as will not .      Pulmonary edema Noted to require oxygen overnight, and more consistent oxygen needs 6/4/2017. CXR with diffuse bilateral pulmonary opacities suggestive of edema. May be iatrogenic in nature secondary to previous IV fluids, no other clear provocative factors. Last echocardiogram in 2013 with EF 65-70% with moderate diastolic dysfunction. Have deferred repeat echocardiogram for PE as discussed above.  - Wean oxygen as able      Acute kidney injury, suspect pre-renal Creatinine is mildly elevated at 1.29 on admission from baseline around 1.1. BUN elevated as well, >20:1.   - Creatinine improved       Nausea Noted after lunch 6/3/2017. Per family, she has eaten significantly more 6/3/17 than is her usual. Having BM. Non-tender on exam.  - Resolved 6/4/2017      Abdominal tenderness Noted on admission, none present at this time. LFTs unremarkable. Unclear etiology, but appears to have resolved.  - Monitor.  "      Leukocytosis, Pyuria LFTs unremarkable. UA mildly abnormal, difficult to interpret in setting of chronic catheterization. UC with 10-50,000 E coli.   - Without suprapubic tenderness or other systemic signs of infection such as fever.   - Monitor.      Hyperlipidemia: Stable.   - Continue prior to admission statin.       Hypertension Blood pressure adequately controlled.   - Continue prior to admission metoprolol, amlodipine.       History of TIA: Aspirin has been discontinued given initiation of formal anticoagulation   - Monitor.      Chronic pain: Stable.   - Continue prior to admission acetaminophen twice daily and as needed.       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   - Continue Hidalgo catheter      Advanced care planning Per report, family with questions 6/4/2017 about if she should be brought back to hospital if respiratory issue recurs at TCU, versus made comfortable at facility. Introduced role of hospice to family, including likely ability for hospice services to be delivered in patient's current home setting if this is what they choose. Discussed that with present plan for discharge to TCU and rehabilitation, would not be hospice appropriate, but encouraged them to discuss this at any time at TCU to arrange a formal meeting with hospice team if they elect to focus purely on comfort in the future.      DVT Prophylaxis: warfarin.     Code: DNR/DNI      Disposition: Anticipating discharge to TCU, likely today.[ZA1.2]                    Physical Exam:      Heart Rate: 73    Blood pressure 153/76, pulse 69, temperature 99  F (37.2  C), temperature source Axillary, resp. rate 18, height 1.473 m (4' 10\"), weight 52.4 kg (115 lb 8.3 oz), last menstrual period 02/28/1950, SpO2 95 %.    Vitals:    06/04/17 0548 06/05/17 0500 06/06/17 0643   Weight: 51.5 kg (113 lb 8.6 oz) 52.7 kg (116 lb 2.9 oz) 52.4 kg (115 lb 8.3 oz)       Vital Sign " Ranges  Temperature Temp  Av.2  F (36.8  C)  Min: 97.6  F (36.4  C)  Max: 99  F (37.2  C)   Blood pressure Systolic (24hrs), Av , Min:136 , Max:153        Diastolic (24hrs), Av, Min:75, Max:76      Pulse Pulse  Av  Min: 69  Max: 69   Respirations Resp  Av.7  Min: 16  Max: 19   Pulse oximetry SpO2  Av.9 %  Min: 85 %  Max: 95 %     Vital Signs with Ranges  Temp:  [97.6  F (36.4  C)-99  F (37.2  C)] 99  F (37.2  C)  Pulse:  [69] 69  Heart Rate:  [73-77] 73  Resp:  [16-19] 18  BP: (136-153)/(75-76) 153/76  SpO2:  [85 %-95 %] 95 %    I/O Last 3 Shifts:   I/O last 3 completed shifts:  In: 160 [P.O.:160]  Out: 1000 [Urine:1000]    I/O past 24 hours:     Intake/Output Summary (Last 24 hours) at 17 0935  Last data filed at 17 0624   Gross per 24 hour   Intake              160 ml   Output             1000 ml   Net             -840 ml[ZA1.1]     GENERAL: Alert and oriented. NAD. Conversational, appropriate. Hard of hearing. Pleasant.   HEENT: Normocephalic. EOMI. No icterus or injection. Nares normal. NC in place.   LUNGS: Decrement bilaterally, improved. No dyspnea at rest.   HEART: Regular rate. Extremities perfused.   ABDOMEN: Soft, nontender, and nondistended. Positive bowel sounds.   EXTREMITIES: LE edema noted.   NEUROLOGIC: Moves extremities x4. No acute focal neurologic abnormalities noted.[ZA1.2]          Prior to Admission Medications:        Prescriptions Prior to Admission   Medication Sig Dispense Refill Last Dose     multivitamin, therapeutic with minerals (MULTI-VITAMIN) TABS tablet Take 1 tablet by mouth daily   2017 at Unknown time     Acetaminophen (TYLENOL PO) Take 500 mg by mouth daily as needed for mild pain or fever At MN if needed   prn med     DOCUSATE SODIUM PO Take 100-400 mg by mouth as needed for constipation   prn med     polyethylene glycol (MIRALAX/GLYCOLAX) packet Take 1 packet by mouth Every Mon, Wed, Fri Morning    2017 at Unknown time      CIPROFLOXACIN PO Take 250 mg by mouth daily as needed   prn med     BISACODYL RE Place 1 suppository rectally daily as needed   prn  med     TRAMADOL HCL PO Take 50 mg by mouth every 6 hours as needed for moderate to severe pain   prn med     sodium phosphate (FLEET ENEMA) 7-19 GM/118ML rectal enema Place 1 enema rectally daily as needed for constipation   prn med     psyllium (METAMUCIL/KONSYL) packet Take 1 packet by mouth daily    5/31/2017 at Unknown time     Acetaminophen (TYLENOL PO) Take 1,000 mg by mouth 2 times daily    5/31/2017 at pm     AmLODIPine Besylate (NORVASC PO) Take 2.5 mg by mouth daily    5/31/2017 at Unknown time     VITAMIN D, CHOLECALCIFEROL, PO Take 1,000 Units by mouth daily   5/31/2017 at Unknown time     HYDROcodone-acetaminophen (VICODIN) 5-500 MG per tablet Take 1-2 tablets by mouth every 6 hours as needed for moderate to severe pain   prn med     aspirin 81 MG EC tablet Take 1 tablet by mouth daily. 90 tablet 3 5/31/2017 at Unknown time     OMEPRAZOLE PO Take 20 mg by mouth daily.   5/31/2017 at Unknown time     ATORVASTATIN CALCIUM PO Take 10 mg by mouth At Bedtime.   5/31/2017 at Unknown time     LOSARTAN POTASSIUM PO Take 100 mg by mouth daily    5/31/2017 at Unknown time     METOPROLOL SUCCINATE PO Take 50 mg by mouth daily    5/31/2017 at Unknown time     Levothyroxine Sodium (LEVOXYL PO) Take 88 mcg by mouth daily.   5/31/2017 at Unknown time     DOCUSATE SODIUM PO Take 300 mg by mouth every evening    5/31/2017 at Unknown time            Medications:        Current Facility-Administered Medications   Medication Last Rate     - MEDICATION INSTRUCTIONS -       Warfarin Therapy Reminder       Current Facility-Administered Medications   Medication Dose Route Frequency     enoxaparin  1 mg/kg Subcutaneous Q12H     sodium chloride (PF)  3 mL Intracatheter Q8H     acetaminophen (TYLENOL) tablet 1,000 mg  1,000 mg Oral BID     amLODIPine (NORVASC) tablet 2.5 mg  2.5 mg Oral Daily      atorvastatin (LIPITOR) tablet 10 mg  10 mg Oral At Bedtime     docusate sodium (COLACE) capsule 300 mg  300 mg Oral QPM     levothyroxine (SYNTHROID/LEVOTHROID) tablet 88 mcg  88 mcg Oral Daily     metoprolol (TOPROL-XL) 24 hr tablet 50 mg  50 mg Oral Daily     omeprazole (priLOSEC) CR capsule 20 mg  20 mg Oral Daily     polyethylene glycol  17 g Oral Q Mon Wed Fri AM     psyllium  1 packet Oral Daily     Current Facility-Administered Medications   Medication Dose Route Frequency     lidocaine  1 mL Other Q1H PRN     lidocaine 4%   Topical Q1H PRN     sodium chloride (PF)  3 mL Intracatheter Q1H PRN     naloxone  0.1-0.4 mg Intravenous Q2 Min PRN     - MEDICATION INSTRUCTIONS -   Does not apply Continuous PRN     acetaminophen  325 mg Oral Q8H PRN     bisacodyl  10 mg Rectal Daily PRN     ondansetron  4 mg Oral Q6H PRN    Or     ondansetron  4 mg Intravenous Q6H PRN     prochlorperazine  5 mg Intravenous Q6H PRN    Or     prochlorperazine  5 mg Oral Q6H PRN    Or     prochlorperazine  12.5 mg Rectal Q12H PRN     Warfarin Therapy Reminder  1 each Does not apply Continuous PRN            Data:      Lab data reviewed.     Recent Labs  Lab 06/06/17  0815 06/05/17  1215 06/05/17  0809  06/04/17  0757 06/03/17  0814 06/02/17  0544 06/01/17  1059   HGB 10.6*  --   --   --   --  10.7* 10.5* 11.4*   *  --   --   --   --  102* 102* 101*     --   --   --   --  238 207 246   INR 2.82*  --  2.95*  --  1.88* 1.50* 1.30* 1.05    144 146*  < >  --  145* 143 142   POTASSIUM 4.5  --  4.2  --   --  4.2 4.1 4.1   CHLORIDE 109  --  112*  --   --  112* 113* 107   CO2 31  --  32  --   --  25 24 27   BUN 16  --  22  --   --  23 32* 42*   CR 0.70  --  0.85  --   --  0.74 0.85 1.29*   ANIONGAP 4  --  2*  --   --  8 6 8   NAJMA 9.5  --  9.4  --   --  9.2 8.7 9.7   GLC 85  --  86  --   --  86 92 117*   TROPI  --   --   --   --   --   --   --  <0.015The 99th percentile for upper reference range is 0.045 ug/L.  Troponin values  in the range of 0.045 - 0.120 ug/L may be associated with risks of adverse clinical events.   < > = values in this interval not displayed.        Imaging:      Imaging data reviewed.     Dr. Eliseo Douglass D.O.  [facility] Services[facility]  Pager [phone][ZA1.1]       Revision History        User Key Date/Time User Provider Type Action    > ZA1.2 6/6/2017 11:58 AM Eliseo Douglass DO Physician Sign     ZA1.1 6/6/2017  9:35 AM Eliseo Douglass DO Physician             Progress Notes by Juliette De La Cruz RN at 6/6/2017  6:49 AM     Author:  Juliette De La Cruz RN Service:  (none) Author Type:  Registered Nurse    Filed:  6/6/2017  6:50 AM Date of Service:  6/6/2017  6:49 AM Creation Time:  6/6/2017  6:49 AM    Status:  Signed :  Juliette De La Cruz RN (Registered Nurse)         Pt. Output for night shift was 200 mL, intake 70 mL for shift.[JB1.1]       Revision History        User Key Date/Time User Provider Type Action    > JB1.1 6/6/2017  6:50 AM Juliette De La Cruz RN Registered Nurse Sign            Progress Notes by Irwin Srivastava at 6/5/2017  3:13 PM     Author:  Irwin Srivastava Service:  Social Work Author Type:      Filed:  6/5/2017  3:16 PM Date of Service:  6/5/2017  3:13 PM Creation Time:  6/5/2017  3:13 PM    Status:  Signed :  Irwin Srivastava ()         SW  D) Patient is not ready for D/C today but may be ready in another day. A  TCU stay was planned at [facility]. A Palliative Care consult is now ordered.  I) Updated Rodríguez in Admissions at [facility]. He is holding a TCU bed for patient.   P) Will keep Rodríguez updated on the discharge plan.[RH1.1]     Revision History        User Key Date/Time User Provider Type Action    > RH1.1 6/5/2017  3:16 PM Irwin Srivastava  Sign            Progress Notes by Eliseo Douglass DO at 6/5/2017  7:20 AM     Author:  Eliseo Douglass DO Service:  Hospitalist Author Type:  Physician     Filed:  6/5/2017 10:02 AM Date of Service:  6/5/2017  7:20 AM Creation Time:  6/5/2017  7:20 AM    Status:  Signed :  Eliseo Douglass DO (Physician)         [facility]  Hospitalist Progress Note        Eliseo Douglass DO  06/05/2017        Interval History:[ZA1.1]      Patient states that she is doing well. Discussed plan, patient verbalized understanding.[ZA1.2]          Assessment and Plan:        Christy Rueda is a 89 year-old female with a past medical history of hypertension, hyperlipidemia, transient ischemic attack, history of left-sided breast cancer status post mastectomy, chronic Hidalgo catheter due to urinary retention and hypothyroidism who presents with shortness of breath and right-sided chest pain admitted on 6/1/2017 after being diagnosed with pulmonary embolism.     Acute right-sided pulmonary emboli  Presented with right-sided chest pain and shortness of breath. Generally has a sedentary lifestyle with further decrease in activity recently. Also with history of breast cancer s/p mastectomy and chemotherapy ~15-20 years ago, now with innumerable pulmonary nodules as noted below, can not rule out metastatic disease, may contribute to propensity for clotting. Risks/benefits conversation regarding anticoagulation held during admission. Family agreeable to anticoagulation. LE US negative for DVT. Discussed with family echocardiogram as part of typical PE work-up. As LE US negative for DVT would be unlikely to pursue IVC filter placement with any RV dysfunction, especially given her age and questionable utility of this intervention when anticoagulation is not contraindicated. Family agreeable to forgo. Note troponin negative, NtpBNP within referance range.   -[ZA1.1] Enoxaparin for 1-2 days while INR therapeutic.    - Warfarin, pharm to dose.[ZA1.2]    -[ZA1.1] P[ZA1.2]bel for indefinite anticoagulation, however can be reassessed if she has high risks events such  as falls at home that make risk of bleeding unacceptably high, hopefully after at least a 3 month course.      Innumerable bilateral pulmonary nodules  Indeterminate in nature on CT. Can not exclude metastatic disease, does have history of breast cancer.[ZA1.1]   - Per report, d[ZA1.2]iscussed with family. At this point would not pursue any systemic treatment if diagnosed with metastatic disease, agreeable to forgo any additional testing or serial imaging as will not .     Pulmonary edema  Noted to require oxygen overnight, and more consistent oxygen needs 6/4/2017. CXR with diffuse bilateral pulmonary opacities suggestive of edema. May be iatrogenic in nature secondary to previous IV fluids, no other clear provocative factors. Last echocardiogram in 2013 with EF 65-70% with moderate diastolic dysfunction. Have deferred repeat echocardiogram for PE as discussed above.   - Give furosemide IV 20 mg x1[ZA1.1] on 6/4 per report.[ZA1.2]    - Wean oxygen as able     Acute kidney injury, suspect pre-renal    Creatinine is mildly elevated at 1.29 on admission from baseline around 1.1. BUN elevated as well, >20:1.    - Creatinine improved      Nausea  Noted after lunch 6/3/2017. Per family, she has eaten significantly more 6/3/17 than is her usual. Having BM. Non-tender on exam.   - Resolved 6/4/2017     Abdominal tenderness  Noted on admission, none present at this time. LFTs unremarkable. Unclear etiology, but appears to have resolved.[ZA1.1]   - Monitor.[ZA1.2]      Leukocytosis[ZA1.1],[ZA1.2] Pyuria  LFTs unremarkable. UA mildly abnormal, difficult to interpret in setting of chronic catheterization. UC with 10-50,000 E coli.[ZA1.1]    - W[ZA1.2]ithout suprapubic tenderness or other systemic signs of infection such as fever.[ZA1.1]    - Monitor.[ZA1.2]     Hyperlipidemia[ZA1.1]: Stable.    -[ZA1.2] Continue prior to admission statin.      Hypertension Blood pressure adequately controlled.[ZA1.1]  "   -[ZA1.2] Continue prior to admission metoprolol, amlodipine.      History of TIA[ZA1.1]:[ZA1.2] Aspirin has been discontinued given initiation of formal anticoagulation[ZA1.1]    - Monitor.[ZA1.2]     Chronic pain[ZA1.1]: Stable.    -[ZA1.2] Continue prior to admission acetaminophen twice daily and as needed.      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    - Continue Hidalgo catheter     Advanced care planning[ZA1.1] Per report, f[ZA1.2]robin with questions 2017 about if she should be brought back to hospital if respiratory issue recurs at TCU, versus made comfortable at facility. Introduced role of hospice to family, including likely ability for hospice services to be delivered in patient's current home setting if this is what they choose. Discussed that with present plan for discharge to TCU and rehabilitation, would not be hospice appropriate, but encouraged them to discuss this at any time at TCU to arrange a formal meeting with hospice team if they elect to focus purely on comfort in the future.     DVT Prophylaxis: enoxaparin + warfarin.    Code: DNR/DNI     Disposition: Anticipating discharge to TCU,[ZA1.1] likely 1-2 days due to IV diuresis and hypernatremia.[ZA1.2]                    Physical Exam:      Heart Rate: 71    Blood pressure 133/63, pulse 71, temperature 98.5  F (36.9  C), temperature source Axillary, resp. rate 18, height 1.473 m (4' 10\"), weight 52.7 kg (116 lb 2.9 oz), last menstrual period 1950, SpO2 92 %.    Vitals:    17 0500 17 0548 17 0500   Weight: 50.2 kg (110 lb 10.7 oz) 51.5 kg (113 lb 8.6 oz) 52.7 kg (116 lb 2.9 oz)       Vital Sign Ranges  Temperature Temp  Av.3  F (36.8  C)  Min: 97.9  F (36.6  C)  Max: 98.5  F (36.9  C)   Blood pressure Systolic (24hrs), Av , Min:125 , Max:133        Diastolic (24hrs), Av, Min:57, Max:63      Pulse Pulse  Av  Min: 71  Max: 71 "   Respirations Resp  Av.7  Min: 16  Max: 20   Pulse oximetry SpO2  Av.6 %  Min: 77 %  Max: 96 %     Vital Signs with Ranges  Temp:  [97.9  F (36.6  C)-98.5  F (36.9  C)] 98.5  F (36.9  C)  Pulse:  [71] 71  Heart Rate:  [61-77] 71  Resp:  [16-20] 18  BP: (125-133)/(57-63) 133/63  SpO2:  [77 %-96 %] 92 %    I/O Last 3 Shifts:   I/O last 3 completed shifts:  In: 490 [P.O.:490]  Out: 1125 [Urine:1125]    I/O past 24 hours:     Intake/Output Summary (Last 24 hours) at 17 0720  Last data filed at 17 0600   Gross per 24 hour   Intake              490 ml   Output             1125 ml   Net             -635 ml     GENERAL: Alert and oriented. NAD. Conversational, appropriate.[ZA1.1] Hard of hearing.[ZA1.2]   HEENT: Normocephalic. EOMI. No icterus or injection. Nares normal.   LUNGS:[ZA1.1] Decrement bilaterally[ZA1.2]. No dyspnea at rest.   HEART: Regular rate. Extremities perfused.   ABDOMEN: Soft, nontender, and nondistended. Positive bowel sounds.   EXTREMITIES: LE edema noted.   NEUROLOGIC: Moves extremities x4. No acute focal neurologic abnormalities noted.          Prior to Admission Medications:        Prescriptions Prior to Admission   Medication Sig Dispense Refill Last Dose     multivitamin, therapeutic with minerals (MULTI-VITAMIN) TABS tablet Take 1 tablet by mouth daily   2017 at Unknown time     Acetaminophen (TYLENOL PO) Take 500 mg by mouth daily as needed for mild pain or fever At MN if needed   prn med     DOCUSATE SODIUM PO Take 100-400 mg by mouth as needed for constipation   prn med     polyethylene glycol (MIRALAX/GLYCOLAX) packet Take 1 packet by mouth Every Mon, Wed, Fri Morning    2017 at Unknown time     CIPROFLOXACIN PO Take 250 mg by mouth daily as needed   prn med     BISACODYL RE Place 1 suppository rectally daily as needed   prn  med     TRAMADOL HCL PO Take 50 mg by mouth every 6 hours as needed for moderate to severe pain   prn med     sodium phosphate (FLEET  ENEMA) 7-19 GM/118ML rectal enema Place 1 enema rectally daily as needed for constipation   prn med     psyllium (METAMUCIL/KONSYL) packet Take 1 packet by mouth daily    5/31/2017 at Unknown time     Acetaminophen (TYLENOL PO) Take 1,000 mg by mouth 2 times daily    5/31/2017 at pm     AmLODIPine Besylate (NORVASC PO) Take 2.5 mg by mouth daily    5/31/2017 at Unknown time     VITAMIN D, CHOLECALCIFEROL, PO Take 1,000 Units by mouth daily   5/31/2017 at Unknown time     HYDROcodone-acetaminophen (VICODIN) 5-500 MG per tablet Take 1-2 tablets by mouth every 6 hours as needed for moderate to severe pain   prn med     aspirin 81 MG EC tablet Take 1 tablet by mouth daily. 90 tablet 3 5/31/2017 at Unknown time     OMEPRAZOLE PO Take 20 mg by mouth daily.   5/31/2017 at Unknown time     ATORVASTATIN CALCIUM PO Take 10 mg by mouth At Bedtime.   5/31/2017 at Unknown time     LOSARTAN POTASSIUM PO Take 100 mg by mouth daily    5/31/2017 at Unknown time     METOPROLOL SUCCINATE PO Take 50 mg by mouth daily    5/31/2017 at Unknown time     Levothyroxine Sodium (LEVOXYL PO) Take 88 mcg by mouth daily.   5/31/2017 at Unknown time     DOCUSATE SODIUM PO Take 300 mg by mouth every evening    5/31/2017 at Unknown time            Medications:        Current Facility-Administered Medications   Medication Last Rate     - MEDICATION INSTRUCTIONS -       Warfarin Therapy Reminder       Current Facility-Administered Medications   Medication Dose Route Frequency     enoxaparin  1 mg/kg Subcutaneous Q12H     sodium chloride (PF)  3 mL Intracatheter Q8H     acetaminophen (TYLENOL) tablet 1,000 mg  1,000 mg Oral BID     amLODIPine (NORVASC) tablet 2.5 mg  2.5 mg Oral Daily     atorvastatin (LIPITOR) tablet 10 mg  10 mg Oral At Bedtime     docusate sodium (COLACE) capsule 300 mg  300 mg Oral QPM     levothyroxine (SYNTHROID/LEVOTHROID) tablet 88 mcg  88 mcg Oral Daily     metoprolol (TOPROL-XL) 24 hr tablet 50 mg  50 mg Oral Daily      omeprazole (priLOSEC) CR capsule 20 mg  20 mg Oral Daily     polyethylene glycol  17 g Oral Q Mon Wed Fri AM     psyllium  1 packet Oral Daily     Current Facility-Administered Medications   Medication Dose Route Frequency     lidocaine  1 mL Other Q1H PRN     lidocaine 4%   Topical Q1H PRN     sodium chloride (PF)  3 mL Intracatheter Q1H PRN     naloxone  0.1-0.4 mg Intravenous Q2 Min PRN     - MEDICATION INSTRUCTIONS -   Does not apply Continuous PRN     acetaminophen  325 mg Oral Q8H PRN     bisacodyl  10 mg Rectal Daily PRN     ondansetron  4 mg Oral Q6H PRN    Or     ondansetron  4 mg Intravenous Q6H PRN     prochlorperazine  5 mg Intravenous Q6H PRN    Or     prochlorperazine  5 mg Oral Q6H PRN    Or     prochlorperazine  12.5 mg Rectal Q12H PRN     Warfarin Therapy Reminder  1 each Does not apply Continuous PRN            Data:      Lab data reviewed.     Recent Labs  Lab 06/04/17  0950 06/04/17  0757 06/03/17  0814 06/02/17  0544 06/01/17  1059   HGB  --   --  10.7* 10.5* 11.4*   MCV  --   --  102* 102* 101*   PLT  --   --  238 207 246   INR  --  1.88* 1.50* 1.30* 1.05   *  --  145* 143 142   POTASSIUM  --   --  4.2 4.1 4.1   CHLORIDE  --   --  112* 113* 107   CO2  --   --  25 24 27   BUN  --   --  23 32* 42*   CR  --   --  0.74 0.85 1.29*   ANIONGAP  --   --  8 6 8   NAJMA  --   --  9.2 8.7 9.7   GLC  --   --  86 92 117*   TROPI  --   --   --   --  <0.015The 99th percentile for upper reference range is 0.045 ug/L.  Troponin values in the range of 0.045 - 0.120 ug/L may be associated with risks of adverse clinical events.           Imaging:      Imaging data reviewed.     Dr. Eliseo Douglass D.O.  [facility]  Pager [phone][ZA1.1]       Revision History        User Key Date/Time User Provider Type Action    > ZA1.2 6/5/2017 10:02 AM Eliseo Douglass, DO Physician Sign     ZA1.1 6/5/2017  7:20 AM Eliseo Douglass, DO Physician             Progress Notes by Soren Pepper  MD GARETH at 6/4/2017  2:47 PM     Author:  Soren Pepper MD Service:  Hospitalist Author Type:  Physician    Filed:  6/4/2017  4:17 PM Date of Service:  6/4/2017  2:47 PM Creation Time:  6/4/2017  2:47 PM    Status:  Signed :  Soren Pepper MD (Physician)         [facility]    Hospitalist Progress Note    Date of Service (when I saw the patient): 06/04/2017    Assessment & Plan   Christy Rueda is a 89 year-old female with a past medical history of hypertension, hyperlipidemia, transient ischemic attack, history of left-sided breast cancer status post mastectomy, chronic Hidalgo catheter due to urinary retention and hypothyroidism who presents with shortness of breath and right-sided chest pain admitted on 6/1/2017 after being diagnosed with pulmonary embolism.    Acute right-sided pulmonary emboli  Presented with right-sided chest pain and shortness of breath. Generally has a sedentary lifestyle with further decrease in activity recently. Also with history of breast cancer s/p mastectomy and chemotherapy ~15-20 years ago, now with innumerable pulmonary nodules as noted below, can not rule out metastatic disease, may contribute to propensity for clotting. Risks/benefits conversation[IM1.1] regarding anticoagulation held during admission[IM1.2]. Family agreeable to anticoagulation. LE US negative for DVT. Discussed with family echocardiogram as part of typical PE work-up. As LE US negative for DVT would be unlikely to pursue IVC filter placement with any RV dysfunction, especially given her age and questionable utility of this intervention when anticoagulation is not contraindicated. Family agreeable to forgo. Note troponin negative, NtpBNP within referance range.  -[IM1.1] Transition from heparin drip to enoxaparin SQ in anticipation of bridging for discharge  -[IM1.2] Continue warfarin with pharmacy to dose   - At this point would plan for indefinite anticoagulation, however can be  reassessed if she has high risks events such as falls at home that make risk of bleeding unacceptably high[IM1.1], hopefully after at least a 3 month course.[IM1.2]     Innumerable bilateral pulmonary nodules  Indeterminate in nature on CT. Can not exclude metastatic disease, does have history of breast cancer. Discussed with family. At this point would not pursue any systemic treatment if diagnosed with metastatic disease, agreeable to forgo any additional testing or serial imaging as will not .[IM1.1]    Pulmonary edema  Noted to require oxygen overnight, and more consistent oxygen needs[IM1.2] 6/4/2017[IM1.3].   - CXR with diffuse bilateral pulmonary opacities suggestive of edema. May be iatrogenic in nature secondary to previous IV fluids, no other clear provocative factors. Last echocardiogram in 2013 with EF 65-70% with moderate diastolic dysfunction. Have deferred repeat echocardiogram for PE as discussed above.  - Give furosemide IV 20 mg x1, will not scheduled diuretics so BMP can be followed up[IM1.2] 6/5/17[IM1.4] given recent RAPHAEL  - Wean oxygen as able[IM1.2]    Acute kidney injury, suspect pre-renal    Creatinine is mildly elevated at 1.29 on admission from baseline around 1.1. BUN elevated as well, >20:1.   - Creatinine improved to below baseline with IV fluids, consistent with pre-renal etiology[IM1.1]  - Concern for pulmonary edema as above, monitor BMP closely with diuresis[IM1.2]     Nausea  Noted after lunch 6/3/2017. Per family, she has eaten significantly more 6/3/17 than is her usual. Having BM. Non-tender on exam.  - Anti-emetics[IM1.1] PRN[IM1.2]  - Encourage[IM1.1]d[IM1.2] smaller meals[IM1.1]  - Resolved[IM1.2] 6/4/2017[IM1.5]    Abdominal tenderness  Noted on admission, none present at this time. LFTs unremarkable. Unclear etiology, but appears[IM1.1] to have resolved.[IM1.2]    Leukocytosis  Pyuria  LFTs unremarkable. UA mildly abnormal, difficult to interpret in setting  of chronic catheterization. UC with 10-50,000[IM1.1] E coli[IM1.2]. Otherwise without suprapubic tenderness or other systemic signs of infection such as fever. WBC resolved to normal range in absence of antibiotics, would continue to hold any antibiotic treatment as suspect her UA and UC results are secondary to chronic catheterization rather than true infection. Can reconsider if any fevers or new symptoms to suspect infection    Hyperlipidemia  Continue prior to admission statin.     Hypertension  - Blood pressure adequately controlled. Continue prior to admission metoprolol, amlodipine.     History of TIA  Aspirin has been discontinued given initiation of formal anticoagulation     Chronic pain  Continue prior to admission acetaminophen twice daily and as needed.     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   - Continue Hidalgo catheter[IM1.1]    Advanced care planning  - Family with questions[IM1.2] 6/4/2017[IM1.6] about if she should be brought back to hospital if respiratory issue recurs at TCU, versus made comfortable at facility. Introduced role of hospice to family, including likely ability for hospice services to be delivered in patient's current home setting if this is what they choose. Discussed that with present plan for discharge to TCU and rehabilitation, would not be hospice appropriate, but encouraged them to discuss this at any time at TCU to arrange a formal meeting with hospice team if they elect to focus purely on comfort in the future.[IM1.2]    DVT Prophylaxis: Heparin drip[IM1.1] with transition to enoxaparin + warfarin.[IM1.2]  Code Status: DNR/DNI    Disposition:[IM1.1] Anticipating discharge to TCU, on hold given CXR findings and IV diuretics. Reassess[IM1.2] 6/5/17[IM1.7]. Discussed with family at bedside, RN, CM/SW.[IM1.2]    Soren Pepper    Interval History   No[IM1.1]beni to be hypoxic overnight and placed on 2L of nasal  cannula. Somewhat more fatigued today per family, but she denies any acute complaints including chest pain, shortness of breath.[IM1.2]     -Data reviewed today: I reviewed all new labs and imaging results over the last 24 hours. I personally reviewed no images or EKG's today.    Physical Exam   Temp: 98.5  F (36.9  C) Temp src: Oral BP: 132/61 Pulse: 71 Heart Rate: 66 Resp: 16 SpO2: 90 % O2 Device: Nasal cannula Oxygen Delivery: 2 LPM  Vitals:    06/02/17 0613 06/03/17 0500 06/04/17 0548   Weight: 48.4 kg (106 lb 11.2 oz) 50.2 kg (110 lb 10.7 oz) 51.5 kg (113 lb 8.6 oz)     Vital Signs with Ranges  Temp:  [98  F (36.7  C)-98.5  F (36.9  C)] 98.5  F (36.9  C)  Pulse:  [64-71] 71  Heart Rate:  [59-77] 66  Resp:  [15-20] 16  BP: (112-132)/(58-61) 132/61  SpO2:  [76 %-96 %] 90 %  I/O last 3 completed shifts:  In: 699 [P.O.:560; I.V.:139]  Out: 550 [Urine:550]    Constitutional: Elderly female, NAD  Respiratory:[IM1.1] Diminished at bilateral bases, somewhat shallow effort[IM1.2]  Cardiovascular: RRR, no m/r/g. 2+ pitting edema to mid-shin   GI: Soft, non-tender, non-distended. BS present.   Skin/Integumen: Warm, dry  Neuro: Alert. Very [place]. Following commands.     Medications     HEParin 700 Units/hr (06/04/17 0556)     - MEDICATION INSTRUCTIONS -       Warfarin Therapy Reminder         warfarin  2.5 mg Oral ONCE at 18:00     enoxaparin  1 mg/kg Subcutaneous Q12H     sodium chloride (PF)  3 mL Intracatheter Q8H     acetaminophen (TYLENOL) tablet 1,000 mg  1,000 mg Oral BID     amLODIPine (NORVASC) tablet 2.5 mg  2.5 mg Oral Daily     atorvastatin (LIPITOR) tablet 10 mg  10 mg Oral At Bedtime     docusate sodium (COLACE) capsule 300 mg  300 mg Oral QPM     levothyroxine (SYNTHROID/LEVOTHROID) tablet 88 mcg  88 mcg Oral Daily     metoprolol (TOPROL-XL) 24 hr tablet 50 mg  50 mg Oral Daily     omeprazole (priLOSEC) CR capsule 20 mg  20 mg Oral Daily     polyethylene glycol  17 g Oral Q Mon Wed Fri AM     psyllium  1  packet Oral Daily       Data     Recent Labs  Lab 06/04/17  0950 06/04/17  0757 06/03/17  0814 06/02/17  0544 06/01/17  1059   WBC  --   --  9.7 17.2* 15.8*   HGB  --   --  10.7* 10.5* 11.4*   MCV  --   --  102* 102* 101*   PLT  --   --  238 207 246   INR  --  1.88* 1.50* 1.30* 1.05   *  --  145* 143 142   POTASSIUM  --   --  4.2 4.1 4.1   CHLORIDE  --   --  112* 113* 107   CO2  --   --  25 24 27   BUN  --   --  23 32* 42*   CR  --   --  0.74 0.85 1.29*   ANIONGAP  --   --  8 6 8   NAMJA  --   --  9.2 8.7 9.7   GLC  --   --  86 92 117*   ALBUMIN  --   --   --  2.5*  --    PROTTOTAL  --   --   --  6.4*  --    BILITOTAL  --   --   --  0.3  --    ALKPHOS  --   --   --  56  --    ALT  --   --   --  15  --    AST  --   --   --  14  --    TROPI  --   --   --   --  <0.015The 99th percentile for upper reference range is 0.045 ug/L.  Troponin values in the range of 0.045 - 0.120 ug/L may be associated with risks of adverse clinical events.       Recent Results (from the past 24 hour(s))   XR Chest 2 Views    Narrative    XR CHEST 2 VW 6/4/2017 1:16 PM    HISTORY: Hypoxia.    COMPARISON: June 1, 2017.      Impression    IMPRESSION: There are diffuse bilateral pulmonary opacities,  suggestive of edema, with likely small bilateral effusions. No  pneumothorax appreciated. Cardiomegaly as before. Lateral views  demonstrate multiple wedge compression deformities in the upper  thoracic spine as before.    HERMINIO ALVAREZ MD[IM1.1]        Revision History        User Key Date/Time User Provider Type Action    > IM1.6 6/4/2017  4:17 PM Soren Pepper MD Physician Sign     IM1.7 6/4/2017  4:11 PM Soren Pepper MD Physician      IM1.4 6/4/2017  4:09 PM Soren Pepper MD Physician      IM1.3 6/4/2017  4:07 PM Soren Pepper MD Physician      IM1.5 6/4/2017  3:49 PM Soren Pepper MD Physician      IM1.2 6/4/2017  3:40 PM Soren Pepper MD Physician      IM1.1 6/4/2017  2:47 PM Soren Pepper MD Physician              Progress Notes by Cherry Escalona LSW at 6/4/2017  2:21 PM     Author:  Cherry Escalona LSW Service:  Social Work Author Type:      Filed:  6/4/2017  2:22 PM Date of Service:  6/4/2017  2:21 PM Creation Time:  6/4/2017  2:21 PM    Status:  Signed :  Cherry Escalona LSW ()         D: SATHYA following for DC planning. Pt not ready for DC to TCU today.  I: SATHYA updated [facility] at [facility]. Briefly spoke with son. Family would like transportation arranged to take pt to TCU. SW informed them the ride is private pay. If needing a portable O2 tank, the cost is $75.  P: SATHYA will follow.     BOB Sherwood  [id number][JJ1.1]       Revision History        User Key Date/Time User Provider Type Action    > JJ1.1 6/4/2017  2:22 PM Cherry Escalona LSW  Sign            Progress Notes by Kennedi Mendosa RN at 6/3/2017  3:11 PM     Author:  Kennedi Mendosa RN Service:  (none) Author Type:  Registered Nurse    Filed:  6/3/2017  3:12 PM Date of Service:  6/3/2017  3:11 PM Creation Time:  6/3/2017  3:11 PM    Status:  Signed :  Kennedi Mendosa RN (Registered Nurse)         Patient asleep & O2 sat machine beeping, sats down to 76%, applied 2 liters nasal cannula.  When patient is awake her sats have been 90-93%.[CR1.1]     Revision History        User Key Date/Time User Provider Type Action    > CR1.1 6/3/2017  3:12 PM Kennedi Mendosa RN Registered Nurse Sign            Progress Notes by Soren Pepper MD at 6/3/2017 12:30 PM     Author:  Soren Pepper MD Service:  Hospitalist Author Type:  Physician    Filed:  6/3/2017 12:39 PM Date of Service:  6/3/2017 12:30 PM Creation Time:  6/3/2017 12:30 PM    Status:  Signed :  Soren Pepper MD (Physician)         [place] Hospitalist Progress Note    Date of Service (when I saw the patient): 06/03/2017    Assessment & Plan   Christy Rueda  is a 89 year-old female with a past medical history of hypertension, hyperlipidemia, transient ischemic attack, history of left-sided breast cancer status post mastectomy, chronic Hidalgo catheter due to urinary retention and hypothyroidism who presents with shortness of breath and right-sided chest pain admitted on 6/1/2017 after being diagnosed with pulmonary embolism.     Acute right-sided pulmonary emboli  Presented with right-sided chest pain and shortness of breath. Generally has a sedentary lifestyle with further decrease in activity recently. Also with history of breast cancer s/p mastectomy and chemotherapy ~15-20 years ago, now with innumerable pulmonary nodules as noted below, can not rule out metastatic disease, may contribute to propensity for clotting. Risks/benefits conversation held on admission by admitting provider, discussed again 6/2/2017. Family agreeable to anticoagulation. LE US negative for DVT. Discussed with family echocardiogram as part of typical PE work-up. As LE US negative for DVT would be unlikely to pursue IVC filter placement with any RV dysfunction, especially given her age and questionable utility of this intervention when anticoagulation is not contraindicated. Family agreeable to forgo. Note troponin negative, NtpBNP within referance range.  - Continue heparin drip as bridge to warfarin.   - Continue warfarin with pharmacy to dose   - At this point would plan for indefinite anticoagulation, however can be reassessed if she has high risks events such as falls at home that make risk of bleeding unacceptably high.     Innumerable bilateral pulmonary nodules  Indeterminate in nature on CT. Can not exclude metastatic disease, does have history of breast cancer. Discussed with family. At this point would not pursue any systemic treatment if diagnosed with metastatic disease, agreeable to forgo any additional testing or serial imaging as will not .    Acute kidney  injury, suspect pre-renal    Creatinine is mildly elevated at 1.29 on admission from baseline around 1.1. BUN elevated as well, >20:1.   - Creatinine improved to below baseline with IV fluids, consistent with pre-renal etiology  - Continues to remain at or below baseline off of IV fluids  - Monitor BMP    Nausea  Noted after lunch[IM1.1] 6/3/2017[IM1.2]. Per family, she has eaten significantly more[IM1.1] 6/3/17[IM1.2] than is her usual. Having BM. Non-tender on exam.   - Anti-emetics  - Encourage smaller meals   - Continue to monitor     Abdominal tenderness  Noted on admission, none present at this time. LFTs unremarkable. Unclear etiology, but appears resolved    Leukocytosis  Pyuria   LFTs unremarkable. UA mildly abnormal, difficult to interpret in setting of chronic catheterization. UC with 10-50,000 GNR. Otherwise without suprapubic tenderness or other systemic signs of infection such as fever.   - WBC resolved to normal range in absence of antibiotics, would continue to hold any antibiotic treatment as suspect her UA and UC results are secondary to chronic catheterization rather than true infection. Can reconsider if any fevers or new symptoms to suspect infection    Hyperlipidemia  Continue prior to admission statin.     Hypertension  - Blood pressure adequately controlled. Continue prior to admission metoprolol, amlodipine.     History of TIA  Aspirin has been discontinued given initiation of formal anticoagulation     Chronic pain  Continue prior to admission acetaminophen twice daily and as needed.     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   - Continue Hidalgo catheter    DVT Prophylaxis: Heparin drip   Code Status: DNR/DNI    Disposition: Expected discharge to TCU[IM1.1] 6/4/17[IM1.3] pending resolution of nausea, tolerating diet, remains off of oxygen. Anticipate will need enoxaparin bridging on discharge.     Soren Stokes  History   No acute events overnight. Ate a large lunch and subsequently had belching associated with nausea. Per RN had BM this morning. Patient not able to state when she last passed gas. Denies any chest pain or shortness of breath.     -Data reviewed today: I reviewed all new labs and imaging results over the last 24 hours. I personally reviewed no images or EKG's today.    Physical Exam   Temp: 98  F (36.7  C) Temp src: Oral BP: 166/84 Pulse: 60 Heart Rate: 69 Resp: 16 SpO2: 90 % O2 Device: (P) None (Room air)    Vitals:    06/01/17 1220 06/02/17 0613 06/03/17 0500   Weight: 47.6 kg (105 lb) 48.4 kg (106 lb 11.2 oz) 50.2 kg (110 lb 10.7 oz)     Vital Signs with Ranges  Temp:  [97.4  F (36.3  C)-98  F (36.7  C)] 98  F (36.7  C)  Pulse:  [60] 60  Heart Rate:  [67-69] 69  Resp:  [16-18] 16  BP: (123-166)/(65-84) 166/84  SpO2:  [90 %-92 %] 90 %  I/O last 3 completed shifts:  In: 413 [P.O.:100; I.V.:313]  Out: 775 [Urine:775]    Constitutional: Elderly female, NAD  Respiratory: Clear to auscultation bilaterally, good air movement bilaterally  Cardiovascular: RRR, no m/r/g. 2+ pitting edema to mid-shin   GI: Soft, non-tender, non-distended. BS present.   Skin/Integumen: Warm, dry  Neuro: Alert. Very [place]. Following commands.     Medications     HEParin 700 Units/hr (06/02/17 1724)     - MEDICATION INSTRUCTIONS -       Warfarin Therapy Reminder         warfarin  2.5 mg Oral ONCE at 18:00     sodium chloride (PF)  3 mL Intracatheter Q8H     acetaminophen (TYLENOL) tablet 1,000 mg  1,000 mg Oral BID     amLODIPine (NORVASC) tablet 2.5 mg  2.5 mg Oral Daily     atorvastatin (LIPITOR) tablet 10 mg  10 mg Oral At Bedtime     docusate sodium (COLACE) capsule 300 mg  300 mg Oral QPM     levothyroxine (SYNTHROID/LEVOTHROID) tablet 88 mcg  88 mcg Oral Daily     metoprolol (TOPROL-XL) 24 hr tablet 50 mg  50 mg Oral Daily     omeprazole (priLOSEC) CR capsule 20 mg  20 mg Oral Daily     polyethylene glycol  17 g Oral Q Mon Wed Fri  AM     psyllium  1 packet Oral Daily       Data     Recent Labs  Lab 06/03/17  0814 06/02/17  0544 06/01/17  1059   WBC 9.7 17.2* 15.8*   HGB 10.7* 10.5* 11.4*   * 102* 101*    207 246   INR 1.50* 1.30* 1.05   * 143 142   POTASSIUM 4.2 4.1 4.1   CHLORIDE 112* 113* 107   CO2 25 24 27   BUN 23 32* 42*   CR 0.74 0.85 1.29*   ANIONGAP 8 6 8   NAJMA 9.2 8.7 9.7   GLC 86 92 117*   ALBUMIN  --  2.5*  --    PROTTOTAL  --  6.4*  --    BILITOTAL  --  0.3  --    ALKPHOS  --  56  --    ALT  --  15  --    AST  --  14  --    TROPI  --   --  <0.015The 99th percentile for upper reference range is 0.045 ug/L.  Troponin values in the range of 0.045 - 0.120 ug/L may be associated with risks of adverse clinical events.       No results found for this or any previous visit (from the past 24 hour(s)).[IM1.1]     Revision History        User Key Date/Time User Provider Type Action    > IM1.2 6/3/2017 12:39 PM Soren Pepper MD Physician Sign     IM1.3 6/3/2017 12:31 PM Soren Pepper MD Physician      IM1.1 6/3/2017 12:30 PM Soren Pepper MD Physician                   Procedure Notes     No notes of this type exist for this encounter.      Progress Notes - Therapies (Notes from 06/03/17 through 06/06/17)     No notes of this type exist for this encounter.

## 2017-06-01 NOTE — IP AVS SNAPSHOT
` ` Patient Information     Patient Name Sex     Christy Rueda ([id number]) Female 1915       Room Bed    [facility] 6621-02      Patient Demographics     Address Phone    [address]  [facility] [address] [phone] (Home) *Preferred*      Patient Ethnicity & Race     Ethnic Group Patient Race    American White      Emergency Contact(s)     Name Relation Home Work Mobile    Efrain Rueda Son [phone]    Mariya Rueda Daughter [phone]      Documents on File        Status Date Received Description       Documents for the Patient    Face Sheet Received () 10/20/09 see pass    Privacy Notice - [place] Received 11     Insurance Card Received 11     External Medication Information Consent       Patient ID Received 11     Consent for Services - Hospital/Clinic Received () 12     Consent for Services - Hospital/Clinic Received () 11    Privacy Notice - [place] Received 11    Insurance Card Received 08/09/15     Insurance Card       Patient ID Received 12     Insurance Card Received 12     Insurance Card       Business/Insurance/Care Coordination/Health Form - Patient.1 Received 12     Business/Insurance/Care Coordination/Health Form - Patient.1       Consent for Services - Hospital/Clinic       Consent for EHR Access  13 Copied from existing Consent for services - C/HOD collected on 2012    [facility] Specified Other       Occupational Therapy Certification Received 13     Speech Therapy Certification Received 13     HIM YELENA Authorization - File Only   YELENA    Patient ID Received 08/09/15     Consent for Services - Hospital/Clinic Received () 08/09/15     HIM YELENA Authorization  08/10/15     Consent for Services/Privacy Notice - Hospital/Clinic Received 16     Advance Directives and Living Will Received 10/21/16  POLST 02/28/2014    Insurance Card Received 04/28/17     Advance Directives and Living Will Received 06/03/17 POLST 04/11/14    Other Received (Deleted) 11/29/11     Insurance Card  (Deleted)      Speech Therapy Certification  (Deleted) 07/10/13 requested cert    Patient ID Received (Deleted) 04/28/17        Documents for the Encounter    CMS IM for Patient Signature Received 06/03/17 1MM      Admission Information     Attending Provider Admitting Provider Admission Type Admission Date/Time    Sunita Gan MD Rumicho, Kuwe Edossa, MD Emergency 06/01/17  1028    Discharge Date Hospital Service Auth/Cert Status Service Area     General Medicine Incomplete [facility]    Unit Room/Bed Admission Status        [facility] SPEC UNIT 6621/6621-02 Admission (Confirmed)       Admission     Complaint    Pulmonary embolism (H)      Hospital Account     Name Acct ID Class Status Primary Coverage    Christy Rueda [id number] Inpatient Open MEDICARE - MEDICARE            Guarantor Account (for Hospital Account #[id number])     Name Relation to Pt Service Area Active? Acct Type    Christy Rueda  FCS Yes Personal/Family    Address Phone          [address]  [place] [address] [phone](H)  none(O)              Coverage Information (for Hospital Account #[id number])     1. MEDICARE/MEDICARE     F/O Payor/Plan Precert #    MEDICARE/MEDICARE     Subscriber Subscriber #    Christy Rueda [id number]    Address Phone    [employer]  [address]  [place] [address] [phone]          2. [place]     F/O Payor/Plan Precert #    [place]     Subscriber Subscriber #    Christy Rueda [id number]    Address Phone    [address] [address]  [place] [address] [phone]

## 2017-06-01 NOTE — PHARMACY-ADMISSION MEDICATION HISTORY
Admission medication history interview status for the 6/1/2017  admission is complete. See EPIC admission navigator for prior to admission medications     Medication history source reliability:Good    Actions taken by pharmacist (provider contacted, etc): consulted MAR from assisted living ([facility] [phone])     Additional medication history information not noted on PTA med list :None    Medication reconciliation/reorder completed by provider prior to medication history? No    Time spent in this activity: 15 minutes    Prior to Admission medications    Medication Sig Last Dose Taking? Auth Provider   multivitamin, therapeutic with minerals (MULTI-VITAMIN) TABS tablet Take 1 tablet by mouth daily 5/31/2017 at Unknown time Yes Unknown, Entered By History   Acetaminophen (TYLENOL PO) Take 500 mg by mouth daily as needed for mild pain or fever At [place] if needed prn med Yes Unknown, Entered By History   DOCUSATE SODIUM PO Take 100-400 mg by mouth as needed for constipation prn med Yes Unknown, Entered By History   polyethylene glycol (MIRALAX/GLYCOLAX) packet Take 1 packet by mouth Every Mon, Wed, Fri Morning  5/31/2017 at Unknown time Yes Unknown, Entered By History   CIPROFLOXACIN PO Take 250 mg by mouth daily as needed prn med Yes Unknown, Entered By History   BISACODYL RE Place 1 suppository rectally daily as needed prn  med Yes Unknown, Entered By History   TRAMADOL HCL PO Take 50 mg by mouth every 6 hours as needed for moderate to severe pain prn med Yes Unknown, Entered By History   sodium phosphate (FLEET ENEMA) 7-19 GM/118ML rectal enema Place 1 enema rectally daily as needed for constipation prn med Yes Unknown, Entered By History   psyllium (METAMUCIL/KONSYL) packet Take 1 packet by mouth daily  5/31/2017 at Unknown time Yes Unknown, Entered By History   Acetaminophen (TYLENOL PO) Take 1,000 mg by mouth 2 times daily  5/31/2017 at pm Yes Reported, Patient   AmLODIPine Besylate (NORVASC PO) Take  2.5 mg by mouth daily  5/31/2017 at Unknown time Yes Reported, Patient   VITAMIN D, CHOLECALCIFEROL, PO Take 1,000 Units by mouth daily 5/31/2017 at Unknown time Yes Reported, Patient   HYDROcodone-acetaminophen (VICODIN) 5-500 MG per tablet Take 1-2 tablets by mouth every 6 hours as needed for moderate to severe pain prn med Yes Reported, Patient   aspirin 81 MG EC tablet Take 1 tablet by mouth daily. 5/31/2017 at Unknown time Yes Juliann Franco MD   OMEPRAZOLE PO Take 20 mg by mouth daily. 5/31/2017 at Unknown time Yes Unknown, Entered By History   ATORVASTATIN CALCIUM PO Take 10 mg by mouth At Bedtime. 5/31/2017 at Unknown time Yes Unknown, Entered By History   LOSARTAN POTASSIUM PO Take 100 mg by mouth daily  5/31/2017 at Unknown time Yes Unknown, Entered By History   METOPROLOL SUCCINATE PO Take 50 mg by mouth daily  5/31/2017 at Unknown time Yes Unknown, Entered By History   Levothyroxine Sodium (LEVOXYL PO) Take 88 mcg by mouth daily. 5/31/2017 at Unknown time Yes Unknown, Entered By History   DOCUSATE SODIUM PO Take 300 mg by mouth every evening  5/31/2017 at Unknown time Yes Unknown, Entered By History   Johnna Pérez, JasonD

## 2017-06-01 NOTE — H&P
PRIMARY CARE PHYSICIAN:  Kathy Pratt MD       CHIEF COMPLAINT:  Right-sided chest pain, shortness of breath.      HISTORY OF PRESENT ILLNESS:  History is obtained from patient's son and daughter-in-law and also discussed with the ED physician.  The patient is minimally contributing to the history as she is dozing off in between.        Christy Rueda is a 89-year-old female with a past medical history of diastolic dysfunction, hyperlipidemia, history of TIA, left-sided breast cancer status post mastectomy who is presenting from her independent living facility due to right-sided chest pain and shortness of breath.  Her son reports that yesterday he was walking her to the dining area and she appeared somewhat short of breath and needed a break to get to the dining area which is not typical for her.  In addition, he reports she complained of right-sided chest pain.  Today the assisted living facility called her family and reported that the patient is short of breath and is having right-sided chest pain.  As a result, she was sent to the emergency room.  Physically, she reports she does not have any pain and does not really interact much as she is dozing off.  There is no report of fever or chills that the family is aware of.  The patient generally is not active and has a sedentary lifestyle.  Therefore, she is usually sitting at a kitchen chair and does not do much activity, but she does use a walker to get around in the facility.  Per family, the patient has been generally weak over the last 1 month and minimally active.      In the emergency room, the patient was evaluated by Dr. Bernard.  Her vital signs showed a temperature of 99.3, pulse in the 70s, blood pressure ranging from -teens. She is saturating 99% on 2 liters nasal cannula though no hypoxia is documented.  Her laboratory was notable for creatinine of 1.29 and BUN of 42.  CBC showed WBC of 15.8, hemoglobin of 11.4.  Troponin was negative.   N-terminal proBNP was in the normal range.  The D-dimer was elevated to 2.2.  Chest CT was obtained which showed a right-sided acute pulmonary emboli.  There were also innumerable small bilateral pulmonary nodules, most indeterminate in nature, measuring up to 1 cm and pulmonary mets not excluded.  Given her acute pulmonary embolism, admission was requested for further management.      PAST MEDICAL HISTORY:     1.  Transient ischemic attack.   2.  Recent hospital admission in 09/2016 due to unresponsive episode which was presumed to be dehydration.   3.  History of thoracic and lumbar compression fractures.   4.  Hyperlipidemia.   5.  Diastolic distention.   6.  Hypertension.   7.  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  Hypothyroidism.   9.  GERD.    10.  Hypertension.      PAST SURGICAL HISTORY:   1.  History of left side breast cancer, status post mastectomy.   2.  History of left knee replacement.   3.  Thyroidectomy.   4.  Septoplasty.   5.  Spine surgery in 2000.      SOCIAL HISTORY:  The patient lives in [facility].  Per family, she does reside in the independent living section.  She is a former smoker.  Per family, she does not drink alcohol.      FAMILY HISTORY:  Reviewed and noncontributory to current presentation.       REVIEW OF SYSTEMS:  Able to complete a 10-point review of systems and is negative except as noted in the history of present illness.      ALLERGIES:  Demerol, nausea and vomiting, Levaquin, morphine and penicillin.      PRIOR TO ADMISSION MEDICATIONS:   1.  Tylenol twice daily.   2.  Tylenol as needed.   3.  Amlodipine 2.5 mg daily.   4.  Aspirin 81 mg daily.   5.  Lipitor 10 mg at bedtime.   6.  Ciprofloxacin 250 mg daily as needed.   7.  Colace 300 mg every evening.   8.  Colace 100-400 mg as needed for constipation.    9.  VIcodin 5/500 as needed every 6 hours.   10.  Levothyroxine 88 mcg daily.   11.  Losartan 100 mg daily.   12.   Metoprolol succinate 50 mg daily.   13.  Omeprazole 20 mg daily.   14.  Tramadol 50 mg as needed every 6 hours.   15.  Metamucil 1 packet daily.   16.  MiraLax every Monday, Wednesday and Friday morning.   17.  Omeprazole 20 mg daily.   18.  Vitamin D daily.      PHYSICAL EXAMINATION:   VITAL SIGNS:  Temperature of 99.3, pulse 105, heart rate 72, blood pressure 112/59, respirations 22, saturations 99% on room air.   GENERAL:  Frail elderly female.  She is sleeping during the examination, but is able to arouse; however, she is minimally interactive during the interview and is falling right back to sleep.   HEENT:  Normocephalic, atraumatic.  Oral mucosa is dry.  No pharyngeal erythema or exudates.   NECK:  Supple.     CARDIOVASCULAR:  Regular rate and rhythm, positive systolic murmur appreciated on the left side.   LUNGS:  Clear to auscultation bilaterally without wheezing or rhonchi.   ABDOMEN:  Soft, mildly distended.  She kind of moans when I palpate on her abdomen but states it does not hurt so it is unclear if she really has abdominal discomfort or not.   EXTREMITIES:  About 2+ lower extremity edema bilaterally.  Per report, this appears to be chronic.   MUSCULOSKELETAL:  She does have kyphosis.   NEUROLOGIC:  She is not oriented to place or time.  Per family, she is usually not oriented to time.      LABORATORY DATA:  Basic metabolic panel:  Sodium 142, potassium 4.1, chloride 107, bicarbonate 27, BUN 42, creatinine 1.29.  N-terminal proBNP is 1612, within the normal range.  Troponin is below the reference range.  CBC shows WBC of 15.8, hemoglobin of 11.4, platelets of 246.  D-dimer 2.2.        IMAGING:  CT of the chest shows right-sided acute pulmonary emboli.  Vascular calcification and tortuous thoracic aorta.  Old granulomatous disease in the left hilum.  Innumerable small bilateral pulmonary nodules, most indeterminate in nature, measuring up to 1 cm.  Primary metastases are not excluded, cholelithiasis.       ASSESSMENT AND PLAN:  The patient is a 89-year-old female with a past medical history of hypertension, hyperlipidemia, transient ischemic attack, history of left-sided breast cancer status post mastectomy, chronic Hidalgo catheter due to urinary retention and hypothyroidism who is presenting for shortness of breath and right-sided chest pain who was found to have a right-sided acute pulmonary emboli and who is being admitted for further management.   1.  Right-sided acute pulmonary emboli.  The patient is presenting with right-sided chest pain as well as shortness of breath.  The patient generally has a sedentary lifestyle and is not active at her baseline, which is likely contributing.  However, malignancy cannot be ruled out at her age in that she does have a history of breast cancer and she is status post mastectomy.  At this point, discussed with her family.  She is high risk for bleeding given her low body weight with anticoagulation and she is an elderly, frail female.  However, given her pulmonary embolism, we are going to start with heparin anticoagulation.  We will monitor closely.  I have also started her on warfarin as well.  Will check lower extremity ultrasound to rule out deep venous thrombosis as she has bilateral lower extremity edema, though per report, this is not new for the patient.  We will place her on continuous pulse oximetry and telemetry.   2.  Acute kidney injury.  Her creatinine is mildly elevated from her baseline at 1.29-1.9.  Baseline, she is around 1.1.  She did receive intravenous contrast for her CAT scan.  Thus I placed on gentle hydration of 50 mL an hour for a total of 1 liter and will follow up on basic metabolic panel.   3.  Abdominal pain.  This is mainly on examination, the patient moans  She does have a history of constipation but it is unclear when she had her last bowel movement.  On imaging she does have cholelithiasis.  We will obtain liver function tests to ensure  no underlying gallbladder pathology regarding her abdominal pain.   4.  Leukocytosis.  This could certainly be reactive; however, she has an indwelling Hidalgo catheter.  This could have be infection versus reactive in nature.  We will obtain a urinalysis with culture.  In addition, obtaining liver function tests to resolve for any abnormality due to her cholelithiasis noted on imaging.   5.  Hyperlipidemia.  Continue prior to admission statin.   6.  Hypertension.  Continue prior to admission metoprolol as well as amlodipine.  As she is being started anticoagulation, holding off on baby aspirin daily.   7.  Chronic pain.  We will continue with her prior to admission atenolol twice daily and as needed.   8.  History of urinary retention with indwelling Hidalgo catheter.  We will obtain a urinalysis as mentioned above given her leukocytosis.   9.  Code status.  She is do not resuscitate, do not intubate.  She was previously do not resuscitate but wanted to intubate.  After discussion with the family that intubation would probably not be recommended in her situation as it might be more of a burden to her than a benefit at this point her life, family reports they want her to be do not intubate as well.   10.  Deep venous thrombosis prophylaxis.  She is on full anticoagulation.      DISPOSITION:  Admitted as an inpatient.         AB CARPENTER MD             D: 2017 15:19   T: 2017 16:22   MT: [id number]      Name:     AMARJIT CASTELLANOS   MRN:      [id number]        Account:      [id number]   :      1915           Admitted:     [id number]      Document: [id number]       cc: Kathy Pratt MD

## 2017-06-01 NOTE — IP AVS SNAPSHOT
"    [place] [address] [facility]: [phone]                                              INTERAGENCY TRANSFER FORM - LAB / IMAGING / EKG / EMG RESULTS   2017                    Hospital Admission Date: 2017  AMARJIT CASTELLANOS   : 1915  Sex: Female        Attending Provider: Sunita Gan MD     Allergies:  Demerol, Levaquin, Morphine, Penicillins    Infection:  None   Service:  GENERAL MEDI    Ht:  1.473 m (4' 10\")   Wt:  52.4 kg (115 lb 8.3 oz)   Admission Wt:  47.6 kg (105 lb)    BMI:  24.14 kg/m 2   BSA:  1.46 m 2            Patient PCP Information     Provider PCP Type    Kathy Prtat General         Lab Results - 3 Days      INR [[id number]] (Abnormal)  Resulted: 17 0855, Result status: Final result    Ordering provider: Sunita Gan MD  17 Resulting lab: [facility]    Specimen Information    Type Source Collected On   Blood  17 0815          Components       Value Reference Range Flag Lab   INR 2.82 0.86 - 1.14 H FrStHsLb            Basic metabolic panel [[id number]]  Resulted: 17 0853, Result status: Final result    Ordering provider: Eliseo Douglass DO  17 Resulting lab: [facility]    Specimen Information    Type Source Collected On   Blood  17          Components       Value Reference Range Flag Lab   Sodium 144 133 - 144 mmol/L  FrStHsLb   Potassium 4.5 3.4 - 5.3 mmol/L  FrStHsLb   Chloride 109 94 - 109 mmol/L  FrStHsLb   Carbon Dioxide 31 20 - 32 mmol/L  FrStHsLb   Anion Gap 4 3 - 14 mmol/L  FrStHsLb   Glucose 85 70 - 99 mg/dL  FrStHsLb   Urea Nitrogen 16 7 - 30 mg/dL  FrStHsLb   Creatinine 0.70 0.52 - 1.04 mg/dL  FrStHsLb   GFR Estimate 76 >60 mL/min/1.7m2  FrStHsLb   Comment:  Non  GFR Calc   GFR Estimate If Black -- >60 mL/min/1.7m2  FrStHsLb   Result:         >90   GFR Calc     Calcium 9.5 8.5 - 10.1 mg/dL  FrStHsLb   Result:   "            CBC with platelets [[id number]] (Abnormal)  Resulted: 06/06/17 0836, Result status: Final result    Ordering provider: Eliseo Douglass DO  06/06/17 0001 Resulting lab: [facility]    Specimen Information    Type Source Collected On   Blood  06/06/17 0815          Components       Value Reference Range Flag Lab   WBC 4.3 4.0 - 11.0 10e9/L  FrStHsLb   RBC Count 3.26 3.8 - 5.2 10e12/L L FrStHsLb   Hemoglobin 10.6 11.7 - 15.7 g/dL L FrStHsLb   Hematocrit 34.1 35.0 - 47.0 % L FrStHsLb    78 - 100 fl H FrStHsLb   MCH 32.5 26.5 - 33.0 pg  FrStHsLb   MCHC 31.1 31.5 - 36.5 g/dL L FrStHsLb   RDW 14.4 10.0 - 15.0 %  FrStHsLb   Platelet Count 235 150 - 450 10e9/L  FrStHsLb            Sodium [[id number]]  Resulted: 06/05/17 1232, Result status: Final result    Ordering provider: Eliseo Douglass DO  06/05/17 0746 Resulting lab: [facility]    Specimen Information    Type Source Collected On   Blood  06/05/17 1215          Components       Value Reference Range Flag Lab   Sodium 144 133 - 144 mmol/L  FrStHsLb            Basic metabolic panel [[id number]] (Abnormal)  Resulted: 06/05/17 0845, Result status: Final result    Ordering provider: Soren Pepper MD  06/05/17 0000 Resulting lab: [facility]    Specimen Information    Type Source Collected On   Blood  06/05/17 0809          Components       Value Reference Range Flag Lab   Sodium 146 133 - 144 mmol/L H FrStHsLb   Potassium 4.2 3.4 - 5.3 mmol/L  FrStHsLb   Chloride 112 94 - 109 mmol/L H FrStHsLb   Carbon Dioxide 32 20 - 32 mmol/L  FrStHsLb   Anion Gap 2 3 - 14 mmol/L L FrStHsLb   Glucose 86 70 - 99 mg/dL  FrStHsLb   Urea Nitrogen 22 7 - 30 mg/dL  FrStHsLb   Creatinine 0.85 0.52 - 1.04 mg/dL  FrStHsLb   GFR Estimate 62 >60 mL/min/1.7m2  FrStHsLb   Comment:  Non  GFR Calc   GFR Estimate If Black 74 >60 mL/min/1.7m2  FrStHsLb   Comment:  African American GFR Calc   Calcium 9.4 8.5 -  10.1 mg/dL  FrStHsLb            INR [[id number]] (Abnormal)  Resulted: 17 0840, Result status: Final result    Ordering provider: Sunita Gan MD  17 0000 Resulting lab: [facility]    Specimen Information    Type Source Collected On   Blood  17 0809          Components       Value Reference Range Flag Lab   INR 2.95 0.86 - 1.14 H FrStHsLb            Sodium [[id number]] (Abnormal)  Resulted: 17 1021, Result status: Final result    Ordering provider: Soren Pepper MD  17 0800 Resulting lab: [facility]    Specimen Information    Type Source Collected On   Blood  17 0950          Components       Value Reference Range Flag Lab   Sodium 146 133 - 144 mmol/L H FrStHsLb            INR [[id number]] (Abnormal)  Resulted: 17 0832, Result status: Final result    Ordering provider: Sunita Gan MD  17 0000 Resulting lab: [facility]    Specimen Information    Type Source Collected On   Blood  17 0757          Components       Value Reference Range Flag Lab   INR 1.88 0.86 - 1.14 H FrStHsLb            Heparin 10a Level [[id number]]  Resulted: 17 0832, Result status: Final result    Ordering provider: Sunita Gan MD  17 0757 Resulting lab: [facility]    Specimen Information    Type Source Collected On     17 0757          Components       Value Reference Range Flag Lab   Heparin 10A Level 0.48 IU/mL  FrStHsLb   Comment:         Therapeutic Range:     UFH:   0.15-0.35 IU/mL for low              intensity dosing            0.30-0.70 IU/mL for high              intensity dosing for              DVT and PE     Enoxaparin: If administered              once daily with dose              1.5mg/k.0-2.0 IU/mL                 If administered              twice daily with dose              1mg/k.60-1.0 IU/mL              Urine Culture Aerobic Bacterial [[id number]]  (Abnormal)  Resulted: 17 0624, Result status: Final result    Ordering provider: Sunita Gan MD  17 Resulting lab: [facility]    Specimen Information    Type Source Collected On     17          Components       Value Reference Range Flag Lab   Specimen Description Catheterized Urine Hidalgo   75   Culture Micro 10,000 to 50,000 colonies/mL Escherichia coli  A 75   Micro Report Status FINAL 2017   75   Organism: 10,000 to 50,000 colonies/mL Escherichia coli   75            INR [[id number]] (Abnormal)  Resulted: 17 0851, Result status: Final result    Ordering provider: Sunita Gan MD  17 0000 Resulting lab: [facility]    Specimen Information    Type Source Collected On   Blood  17 0814          Components       Value Reference Range Flag Lab   INR 1.50 0.86 - 1.14 H FrStHsLb            Heparin 10a Level [[id number]]  Resulted: 17 0851, Result status: Final result    Ordering provider: Sunita Gan MD  17 0814 Resulting lab: [facility]    Specimen Information    Type Source Collected On     17 0814          Components       Value Reference Range Flag Lab   Heparin 10A Level 0.50 IU/mL  FrStHsLb   Comment:         Therapeutic Range:     UFH:   0.15-0.35 IU/mL for low              intensity dosing            0.30-0.70 IU/mL for high              intensity dosing for              DVT and PE     Enoxaparin: If administered              once daily with dose              1.5mg/k.0-2.0 IU/mL                 If administered              twice daily with dose              1mg/k.60-1.0 IU/mL              Basic metabolic panel [[id number]] (Abnormal)  Resulted: 17 0850, Result status: Final result    Ordering provider: Soren Pepper MD  17 0000 Resulting lab: [facility]    Specimen Information    Type Source Collected On   Blood   06/03/17 0814          Components       Value Reference Range Flag Lab   Sodium 145 133 - 144 mmol/L H FrStHsLb   Potassium 4.2 3.4 - 5.3 mmol/L  FrStHsLb   Chloride 112 94 - 109 mmol/L H FrStHsLb   Carbon Dioxide 25 20 - 32 mmol/L  FrStHsLb   Anion Gap 8 3 - 14 mmol/L  FrStHsLb   Glucose 86 70 - 99 mg/dL  FrStHsLb   Urea Nitrogen 23 7 - 30 mg/dL  FrStHsLb   Creatinine 0.74 0.52 - 1.04 mg/dL  FrStHsLb   GFR Estimate 72 >60 mL/min/1.7m2  FrStHsLb   Comment:  Non  GFR Calc   GFR Estimate If Black 87 >60 mL/min/1.7m2  FrStHsLb   Comment:  African American GFR Calc   Calcium 9.2 8.5 - 10.1 mg/dL  FrStHsLb            CBC with platelets [[id number]] (Abnormal)  Resulted: 06/03/17 0832, Result status: Final result    Ordering provider: Soren Pepper MD  06/03/17 0000 Resulting lab: [facility]    Specimen Information    Type Source Collected On   Blood  06/03/17 0814          Components       Value Reference Range Flag Lab   WBC 9.7 4.0 - 11.0 10e9/L  FrStHsLb   RBC Count 3.28 3.8 - 5.2 10e12/L L FrStHsLb   Hemoglobin 10.7 11.7 - 15.7 g/dL L FrStHsLb   Hematocrit 33.5 35.0 - 47.0 % L FrStHsLb    78 - 100 fl H FrStHsLb   MCH 32.6 26.5 - 33.0 pg  FrStHsLb   MCHC 31.9 31.5 - 36.5 g/dL  FrStHsLb   RDW 14.9 10.0 - 15.0 %  FrStHsLb   Platelet Count 238 150 - 450 10e9/L  FrStHsLb            Testing Performed By     Lab - Abbreviation Name Director Address Valid Date Range    14 - FrStHsLb [facility] Unknown [address] [place] [address] 05/08/15 1057 - Present    75 - Unknown [facility] Unknown [address] [facility] [address] 01/15/15 1019 - Present            Unresulted Labs (24h ago through future)    Start       Ordered    06/02/17 0600  INR  (warfarin (COUMADIN) Pharmacy Consult-INITIAL ORDER)  DAILY,   Routine      06/01/17 1545         Imaging Results - 3 Days      XR Chest 1 View [[id number]]  Resulted: 06/05/17 1621, Result  status: Final result    Ordering provider: Eliseo Douglass DO  06/05/17 0958 Resulted by: Marva Levin MD    Performed: 06/05/17 1428 - 06/05/17 1435 Resulting lab: RADIOLOGY RESULTS    Narrative:       CHEST ONE VIEW UPRIGHT  6/5/2017 2:35 PM     HISTORY: Interval.    COMPARISON: 6/4/2017.      Impression:       IMPRESSION: Stable interstitial edema and/or fibrosis since the  comparison study. No new infiltrates.    MARVA LEVIN MD      XR Chest 2 Views [[id number]]  Resulted: 06/04/17 1321, Result status: Final result    Ordering provider: Soren Pepper MD  06/04/17 1226 Resulted by: Herminio Yang MD    Performed: 06/04/17 1303 - 06/04/17 1316 Resulting lab: RADIOLOGY RESULTS    Narrative:       XR CHEST 2 VW 6/4/2017 1:16 PM    HISTORY: Hypoxia.    COMPARISON: June 1, 2017.      Impression:       IMPRESSION: There are diffuse bilateral pulmonary opacities,  suggestive of edema, with likely small bilateral effusions. No  pneumothorax appreciated. Cardiomegaly as before. Lateral views  demonstrate multiple wedge compression deformities in the upper  thoracic spine as before.    HERMINIO YANG MD      Testing Performed By     Lab - Abbreviation Name Director Address Valid Date Range    104 - Rad Rslts RADIOLOGY RESULTS Unknown Unknown 02/16/05 1553 - Present            Encounter-Level Documents:     There are no encounter-level documents.      Order-Level Documents:     There are no order-level documents.

## 2017-06-01 NOTE — IP AVS SNAPSHOT
` `     [place] [address] [facility]: [phone]            Medication Administration Report for Christy Rueda as of 06/06/17 1729   Legend:    Given Hold Not Given Due Canceled Entry Other Actions    Time Time (Time) Time  Time-Action       Inactive    Active    Linked        Medications 05/31/17 06/01/17 06/02/17 06/03/17 06/04/17 06/05/17 06/06/17    acetaminophen (TYLENOL) tablet 1,000 mg  Dose: 1,000 mg Freq: 2 TIMES DAILY Route: PO  Indications of Use: PAIN  Start: 06/01/17 2100   Admin Instructions: Maximum acetaminophen dose from all sources = 75 mg/kg/day not to exceed 4 gram      (2111)-Not Given       2213 (1,000 mg)-Given        1056 (1,000 mg)-Given       2106 (1,000 mg)-Given        0853 (1,000 mg)-Given       2035 (1,000 mg)-Given        0938 (1,000 mg)-Given       2101 (1,000 mg)-Given        0935 (1,000 mg)-Given       2101 (1,000 mg)-Given        0907 (1,000 mg)-Given       [ ] 2100           acetaminophen (TYLENOL) tablet 325 mg  Dose: 325 mg Freq: EVERY 8 HOURS PRN Route: PO  PRN Reason: mild pain  Start: 06/01/17 1545   Admin Instructions: Alternate ibuprofen (if ordered) with acetaminophen.  Maximum acetaminophen dose from all sources = 75 mg/kg/day not to exceed 4 grams/day.               amLODIPine (NORVASC) tablet 2.5 mg  Dose: 2.5 mg Freq: DAILY Route: PO  Indications of Use: HYPERTENSION  Start: 06/01/17 1600   Admin Instructions: Hold for SBP <105      1740 (2.5 mg)-Given        1102 (2.5 mg)-Given        0852 (2.5 mg)-Given        0938 (2.5 mg)-Given        0935 (2.5 mg)-Given        0907 (2.5 mg)-Given           atorvastatin (LIPITOR) tablet 10 mg  Dose: 10 mg Freq: AT BEDTIME Route: PO  Start: 06/01/17 2200     2111 (10 mg)-Given        2106 (10 mg)-Given        2116 (10 mg)-Given        2107 (10 mg)-Given        2101 (10 mg)-Given        [ ] 2200           bisacodyl (DULCOLAX) Suppository 10 mg  Dose: 10 mg Freq: DAILY PRN Route: RE  PRN Reason:  "constipation  Start: 06/01/17 1545   Admin Instructions: Hold for loose stools.  This is the third step of a three step constipation treatment protocol.               docusate sodium (COLACE) capsule 300 mg  Dose: 300 mg Freq: EVERY EVENING Route: PO  Start: 06/01/17 2000     2110 (300 mg)-Given        2106 (300 mg)-Given        2035 (300 mg)-Given        2101 (300 mg)-Given        2008 (300 mg)-Given        [ ] 2000           levothyroxine (SYNTHROID/LEVOTHROID) tablet 88 mcg  Dose: 88 mcg Freq: DAILY Route: PO  Start: 06/01/17 1800     1743 (88 mcg)-Given        1101 (88 mcg)-Given        0853 (88 mcg)-Given        0939 (88 mcg)-Given        0935 (88 mcg)-Given        0906 (88 mcg)-Given           lidocaine (LMX4) cream  Freq: EVERY 1 HOUR PRN Route: Top  PRN Reason: pain  PRN Comment: with VAD insertion or accessing implanted port.  Start: 06/01/17 1041   Admin Instructions: Do NOT give if patient has a history of allergy to any local anesthetic or any \"jacqueline\" product.   Apply 30 minutes prior to VAD insertion or port access.  MAX Dose:  2.5 g (  of 5 g tube)               lidocaine 1 % 1 mL  Dose: 1 mL Freq: EVERY 1 HOUR PRN Route: OTHER  PRN Comment: mild pain with VAD insertion or accessing implanted port  Start: 06/01/17 1041   Admin Instructions: Do NOT give if patient has a history of allergy to any local anesthetic or any \"jacqueline\" product. MAX dose 1 mL subcutaneous OR intradermal in divided doses.               metoprolol (TOPROL-XL) 24 hr tablet 50 mg  Dose: 50 mg Freq: DAILY Route: PO  Start: 06/01/17 1600     1740 (50 mg)-Given        1103 (50 mg)-Given        0853 (50 mg)-Given        0939 (50 mg)-Given        0935 (50 mg)-Given        0906 (50 mg)-Given           naloxone (NARCAN) injection 0.1-0.4 mg  Dose: 0.1-0.4 mg Freq: EVERY 2 MIN PRN Route: IV  PRN Reason: opioid reversal  Start: 06/01/17 1545   Admin Instructions: For respiratory rate LESS than or EQUAL to 8.  Partial reversal dose:  0.1 mg " titrated q 2 minutes for Analgesia Side Effects Monitoring Sedation Level of 3 (frequently drowsy, arousable, drifts to sleep during conversation).Full reversal dose:  0.4 mg bolus for Analgesia Side Effects Monitoring Sedation Level of 4 (somnolent, minimal or no response to stimulation).               omeprazole (priLOSEC) CR capsule 20 mg  Dose: 20 mg Freq: DAILY Route: PO  Start: 06/01/17 1600     1740 (20 mg)-Given        1103 (20 mg)-Given        0852 (20 mg)-Given        0939 (20 mg)-Given        0935 (20 mg)-Given        0907 (20 mg)-Given           ondansetron (ZOFRAN-ODT) ODT tab 4 mg  Dose: 4 mg Freq: EVERY 6 HOURS PRN Route: PO  PRN Reason: nausea  Start: 06/01/17 1545   Admin Instructions: This is Step 1 of nausea and vomiting management.  If nausea not resolved in 15 minutes, go to Step 2 prochlorperazine (COMPAZINE). Do not push through foil backing. Peel back foil and gently remove. Place on tongue immediately. Administration with liquid unnecessary        1230 (4 mg)-Given         2119 (4 mg)-Given           Or  ondansetron (ZOFRAN) injection 4 mg  Dose: 4 mg Freq: EVERY 6 HOURS PRN Route: IV  PRN Reasons: nausea,vomiting  Start: 06/01/17 1545   Admin Instructions: This is Step 1 of nausea and vomiting management.  If nausea not resolved in 15 minutes, go to Step 2 prochlorperazine (COMPAZINE).  Irritant.                             Patient is already receiving anticoagulation with heparin, enoxaparin (LOVENOX), warfarin (COUMADIN)  or other anticoagulant medication  Freq: CONTINUOUS PRN Route: XX  Start: 06/01/17 1545              polyethylene glycol (MIRALAX/GLYCOLAX) Packet 17 g  Dose: 17 g Freq: EVERY MONDAY WEDNESDAY AND FRIDAY MORNING Route: PO  Start: 06/02/17 0800   Admin Instructions: 1 Packet = 17 grams. Mixed prescribed dose in 8 ounces of water. Follow with 8 oz. of water.       1059 (17 g)-Given          0935 (17 g)-Given            prochlorperazine (COMPAZINE) injection 5 mg  Dose: 5  mg Freq: EVERY 6 HOURS PRN Route: IV  PRN Reasons: nausea,vomiting  Start: 06/01/17 1545   Admin Instructions: This is Step 2 of nausea and vomiting management.   If nausea not resolved in 15 minutes, give metoclopramide (REGLAN) if ordered (step 3 of nausea and vomiting management)              Or  prochlorperazine (COMPAZINE) tablet 5 mg  Dose: 5 mg Freq: EVERY 6 HOURS PRN Route: PO  PRN Reason: vomiting  Start: 06/01/17 1545   Admin Instructions: This is Step 2 of nausea and vomiting management.   If nausea not resolved in 15 minutes, give metoclopramide (REGLAN) if ordered (step 3 of nausea and vomiting management)              Or  prochlorperazine (COMPAZINE) Suppository 12.5 mg  Dose: 12.5 mg Freq: EVERY 12 HOURS PRN Route: RE  PRN Reasons: nausea,vomiting  Start: 06/01/17 1545   Admin Instructions: This is Step 2 of nausea and vomiting management.   If nausea not resolved in 15 minutes, give metoclopramide (REGLAN) if ordered (step 3 of nausea and vomiting management)               psyllium (METAMUCIL/KONSYL) Packet 1 packet  Dose: 1 packet Freq: DAILY Route: PO  Start: 06/01/17 1600   Admin Instructions: Powder should be diluted with fluid before given.      1741 (1 packet)-Given        1105 (1 packet)-Given        0852 (1 packet)-Given        0938 (1 packet)-Given        0935 (1 packet)-Given        0907 (1 packet)-Given           sodium chloride (PF) 0.9% PF flush 3 mL  Dose: 3 mL Freq: EVERY 8 HOURS Route: IK  Start: 06/01/17 1042   Admin Instructions: And Q1H PRN, to lock peripheral IV dormant line.      1743 (3 mL)-Given               (0807)-Not Given       1724 (3 mL)-Given        0627 (3 mL)-Given       (0835)-Not Given       [ ] 1400       (2115)-Not Given        (0939)-Not Given       1441 (3 mL)-Given       2110 (3 mL)-Given        0600 (3 mL)-Given              (2104)-Not Given               (1419)-Not Given       [ ] 2200           sodium chloride (PF) 0.9% PF flush 3 mL  Dose: 3 mL Freq: EVERY 1  HOUR PRN Route: IK  PRN Reason: line flush  PRN Comment: for peripheral IV flush post IV meds  Start: 06/01/17 1041        1442 (3 mL)-Given             Warfarin Therapy Reminder (Check START DATE - warfarin may be starting in the FUTURE)  Freq: CONTINUOUS PRN Route: XX  Start: 06/01/17 1545   Admin Instructions: *Note to reorder warfarin daily*  Pharmacy Warfarin Dosing Service  Patient is on Warfarin Therapy - check for daily order                 Dose: 1 each Freq: NO DOSE TODAY (WARFARIN) Route: XX  Start: 06/05/17 0845   End: 06/05/17 2244   Admin Instructions: No dose of Warfarin due today per order          2244-Med Discontinued       Completed Medications  Medications 05/31/17 06/01/17 06/02/17 06/03/17 06/04/17 06/05/17 06/06/17         Dose: 20 mg Freq: ONCE Route: IV  Start: 06/04/17 1400   End: 06/04/17 1441        1441 (20 mg)-Given               Dose: 40 mg Freq: ONCE Route: PO  Start: 06/06/17 1245   End: 06/06/17 1252          1252 (40 mg)-Given             Dose: 1 mg Freq: ONCE AT 6PM Route: PO  Start: 06/06/17 1800   End: 06/06/17 1726          1726 (1 mg)-Given             Dose: 2.5 mg Freq: ONCE AT 6PM Route: PO  Start: 06/04/17 1800   End: 06/04/17 1806        1806 (2.5 mg)-Given               Dose: 2.5 mg Freq: ONCE AT 6PM Route: PO  Start: 06/03/17 1800   End: 06/03/17 1827       1827 (2.5 mg)-Given             Discontinued Medications  Medications 05/31/17 06/01/17 06/02/17 06/03/17 06/04/17 06/05/17 06/06/17         Rate: 75 mL/hr Freq: CONTINUOUS Route: IV  Start: 06/05/17 0800   End: 06/05/17 1245         0935 ( )-New Bag       1245-Med Discontinued          Dose: 1 mg/kg Freq: EVERY 12 HOURS Route: SC  Start: 06/04/17 1900   End: 06/06/17 1156   Admin Instructions: STOP heparin 1-2 hours prior to injection         2055 (50 mg)-Given        0600 (50 mg)-Given       2008 (50 mg)-Given        0638 (50 mg)-Given       1156-Med Discontinued         Dose: 1 mg/kg Freq: EVERY 12 HOURS Route:  SC  Start: 06/04/17 1700   End: 06/04/17 1351   Admin Instructions: STOP heparin 1-2 hours prior to injection         1351-Med Discontinued           Dose: 20 mg Freq: ONCE Route: IV  Start: 06/06/17 1200   End: 06/06/17 1240          (1238)-Not Given       1240-Med Discontinued         Rate: 7 mL/hr Freq: CONTINUOUS Route: IV  Last Dose: Stopped (06/04/17 1909)  Start: 06/02/17 0630   End: 06/04/17 1800   Admin Instructions: ++++Administer Lovenox within 2 hours of heparin gtt D/c++++   Heparin 10a(Xa) =   0.48    06/04/2017            Goal level of: 0.3-0.7 IU/mL  Heparin instructions: Continue rate of 700 units/hr  Recheck next heparin 10a(Xa): in the AM.  Heparin dosed per [place] Protocol. Monitor platelets every three days while on anticoagulation therapy.       0650 (700 Units/hr)-Rate/Dose Verify       0907 (700 Units/hr)-Rate/Dose Verify       1724 (700 Units/hr)-New Bag         0556 (700 Units/hr)-New Bag       1800-Med Discontinued  1909-Stopped          Medications 05/31/17 06/01/17 06/02/17 06/03/17 06/04/17 06/05/17 06/06/17

## 2017-06-01 NOTE — IP AVS SNAPSHOT
[place] [address] [facility]    [address] [address]    Phone:  [phone]                                       After Visit Summary   6/1/2017    Christy Rueda    MRN: [id number]           After Visit Summary Signature Page     I have received my discharge instructions, and my questions have been answered. I have discussed any challenges I see with this plan with the nurse or doctor.    ..........................................................................................................................................  Patient/Patient Representative Signature      ..........................................................................................................................................  Patient Representative Print Name and Relationship to Patient    ..................................................               ................................................  Date                                            Time    ..........................................................................................................................................  Reviewed by Signature/Title    ...................................................              ..............................................  Date                                                            Time

## 2017-06-01 NOTE — ED NOTES
[facility]  ED Nurse Handoff Report    ED Chief complaint: Shortness of Breath (SOB desating at NH in high 80s. c/o right chest pain. hx CHF)      ED Diagnosis:   Final diagnoses:   Other acute pulmonary embolism (H)       Code Status: see epic  Allergies:   Allergies   Allergen Reactions     Demerol Nausea and Vomiting     Levaquin      Morphine      Thinks nausea and vomiting with morphine but is not positive     Penicillins Nausea and Vomiting       Activity level - Baseline/Home:  Independent c walker    Activity Level - Current:   Assist of 1. Pt very weak, hasn't been out of bed here.      Needed?: No    Isolation: No  Infection: Not Applicable    Bariatric?: No    Vital Signs:   Vitals:    06/01/17 1230 06/01/17 1240 06/01/17 1250 06/01/17 1410   BP: 98/50 93/46 91/49 112/59   Pulse:       Resp: 17 24 23 22   Temp:       TempSrc:       SpO2: 100% 100% 100% 99%   Weight:       Height:           Cardiac Rhythm: ,   Cardiac  Cardiac Rhythm: Normal sinus rhythm    Pain level:      Is this patient confused?: Yes    Patient Report: Initial Complaint: lives in independent senior living at [facility]. Normally is able to ambulate with walker independently, but family reports pt has been increasingly fatigued and yesterday was unable to make the usual walk to the dining mays and she had to be pushed. Today c/o SOB and right sided Chest pain, sating high 80's on RA at NH. Given 324mg aspirin and nitro X2 per EMS. Pt has some confusion at baseline, but family reports worsening confusion.   Focused Assessment: increasingly lethargic. Sleeps in bed, unless aroused. BP low initially due to nitro from EMS. Lungs course/crackles. Denies any SOB or CP at rest. Placed on 3L O2 sating high 90's.  Tests Performed: labs, cxr, CT chest  Abnormal Results: CT shows PE , d dimer ^, crt^,  WBC^  Treatments provided: 500cc Bolus NS. Heparin drip and bolus     Family Comments: at bedside.     OBS  brochure/video discussed/provided to patient: N/A    ED Medications:   Medications   lidocaine 1 % 1 mL (not administered)   lidocaine (LMX4) cream (not administered)   sodium chloride (PF) 0.9% PF flush 3 mL (not administered)   sodium chloride (PF) 0.9% PF flush 3 mL (not administered)   heparin infusion 25,000 units in 0.45% NaCl 250 mL (not administered)   heparin Loading Dose bolus dose from infusion pump 3,800 Units (not administered)   0.9% sodium chloride BOLUS (500 mLs Intravenous New Bag 6/1/17 1410)   iopamidol (ISOVUE-370) solution 53 mL (53 mLs Intravenous Given 6/1/17 1306)   sodium chloride 0.9 % for CT scan flush dose 81 mL (81 mLs Intravenous Given 6/1/17 1306)       Drips infusing?:  Yes, heparin       ED NURSE PHONE NUMBER: [phone]

## 2017-06-01 NOTE — PHARMACY-ANTICOAGULATION SERVICE
Clinical Pharmacy - Warfarin Dosing Consult     Pharmacy has been consulted to manage this patient s warfarin therapy.  Indication: DVT/ PE Treatment  Therapy Goal: INR 2-3  Warfarin Prior to Admission: No  Recent documented change in oral intake/nutrition: Unknown    INR   Date Value Ref Range Status   06/01/2017 1.05 0.86 - 1.14 Final   06/18/2013 1.04 0.86 - 1.14 Final       Recommend warfarin 2.5 mg today.  Pharmacy will monitor Christy Rueda daily and order warfarin doses to achieve specified goal.      Please contact pharmacy as soon as possible if the warfarin needs to be held for a procedure or if the warfarin goals change.      Tori Oconnor, PharmD

## 2017-06-01 NOTE — IP AVS SNAPSHOT
"[place] [address] [facility]: [phone]                                              INTERAGENCY TRANSFER FORM - PHYSICIAN ORDERS   2017                    Hospital Admission Date: 2017  AMARJIT CASTELLANOS   : 1915  Sex: Female        Attending Provider: Sunita Gan MD     Allergies:  Demerol, Levaquin, Morphine, Penicillins    Infection:  None   Service:  GENERAL MEDI    Ht:  1.473 m (4' 10\")   Wt:  52.4 kg (115 lb 8.3 oz)   Admission Wt:  47.6 kg (105 lb)    BMI:  24.14 kg/m 2   BSA:  1.46 m 2            Patient PCP Information     Provider PCP Type    K[facility]      ED Clinical Impression     Diagnosis Description Comment Added By Time Added    Other acute pulmonary embolism (H) [I26.99] Other acute pulmonary embolism (H) [I26.99]  Kike Bernard MD 2017  1:29 PM      Hospital Problems as of 2017              Priority Class Noted POA    Pulmonary embolism (H) Medium  2017 Yes      Non-Hospital Problems as of 2017              Priority Class Noted    Transient cerebral ischemia   2013    Unresponsive episode Medium  2016    ACP (advance care planning)   2016      Code Status History     Date Active Date Inactive Code Status Order ID Comments User Context    2017 12:03 PM  DNR/DNI [id number]  Eliseo Douglass DO Outpatient    2017  3:45 PM 2017 12:03 PM DNR/DNI [id number]  Sunita Gan MD Inpatient    2016 10:32 AM 2017  3:45 PM DNR [id number]  Emerson Rutledge PA-C Outpatient    2016  4:08 PM 2016 10:32 AM DNR [id number]  Soren Pepper MD ED    2013  6:24 PM 2013  5:42 PM DNR [id number]  Matt Cordova, RN Inpatient         Medication Review      START taking        Dose / Directions Comments    warfarin 1 MG tablet   Commonly known as:  COUMADIN   Used for:  Other acute pulmonary embolism (H)        Dose:  1 mg   Take 1 tablet (1 mg) by mouth daily "   Quantity:  30 tablet   Refills:  0          CONTINUE these medications which may have CHANGED, or have new prescriptions. If we are uncertain of the size of tablets/capsules you have at home, strength may be listed as something that might have changed.        Dose / Directions Comments    traMADol 50 MG tablet   Commonly known as:  ULTRAM   This may have changed:  reasons to take this   Used for:  Other acute pulmonary embolism (H)        Dose:  50 mg   Take 1 tablet (50 mg) by mouth every 6 hours as needed   Quantity:  28 tablet   Refills:  0          CONTINUE these medications which have NOT CHANGED        Dose / Directions Comments    ATORVASTATIN CALCIUM PO        Dose:  10 mg   Take 10 mg by mouth At Bedtime.   Refills:  0        BISACODYL RE        Dose:  1 suppository   Place 1 suppository rectally daily as needed   Refills:  0        * DOCUSATE SODIUM PO        Dose:  300 mg   Take 300 mg by mouth every evening   Refills:  0        * DOCUSATE SODIUM PO        Dose:  100-400 mg   Take 100-400 mg by mouth as needed for constipation   Refills:  0        LEVOXYL PO        Dose:  88 mcg   Take 88 mcg by mouth daily.   Refills:  0        METOPROLOL SUCCINATE PO        Dose:  50 mg   Take 50 mg by mouth daily   Refills:  0        Multi-vitamin Tabs tablet        Dose:  1 tablet   Take 1 tablet by mouth daily   Refills:  0        NORVASC PO   Indication:  High Blood Pressure        Dose:  2.5 mg   Take 2.5 mg by mouth daily   Refills:  0        OMEPRAZOLE PO        Dose:  20 mg   Take 20 mg by mouth daily.   Refills:  0        polyethylene glycol Packet   Commonly known as:  MIRALAX/GLYCOLAX        Dose:  1 packet   Take 1 packet by mouth Every Mon, Wed, Fri Morning   Refills:  0        psyllium Packet   Commonly known as:  METAMUCIL/KONSYL        Dose:  1 packet   Take 1 packet by mouth daily   Refills:  0        * TYLENOL PO   Indication:  Pain        Dose:  1000 mg   Take 1,000 mg by mouth 2 times daily    Refills:  0        * TYLENOL PO        Dose:  500 mg   Take 500 mg by mouth daily as needed for mild pain or fever At [place] if needed   Refills:  0        VITAMIN D (CHOLECALCIFEROL) PO        Dose:  1000 Units   Take 1,000 Units by mouth daily   Refills:  0        * Notice:  This list has 4 medication(s) that are the same as other medications prescribed for you. Read the directions carefully, and ask your doctor or other care provider to review them with you.      STOP taking     aspirin 81 MG EC tablet           CIPROFLOXACIN PO           HYDROcodone-acetaminophen 5-500 MG per tablet   Commonly known as:  VICODIN           LOSARTAN POTASSIUM PO           sodium phosphate 7-19 GM/118ML rectal enema                     Further instructions from your care team       Chronic franks catheter cares per unit routine      [facility], phone [phone]    Summary of Visit     Reason for your hospital stay       Pulmonary embolism             After Care     Activity - Up with nursing assistance           Advance Diet as Tolerated       Follow this diet upon discharge: Orders Placed This Encounter      Combination Diet Regular Diet Adult       Fall precautions           General info for SNF       Length of Stay Estimate: Short Term Care: Estimated # of Days <30  Condition at Discharge: Improving  Level of care:skilled   Rehabilitation Potential: Fair  Admission H&P remains valid and up-to-date: Yes  Recent Chemotherapy: N/A  Use Nursing Home Standing Orders: Yes       Mantoux instructions       Give two-step Mantoux (PPD) Per Facility Policy Yes             Procedures     Oxygen - Nasal cannula       1 Lpm by nasal cannula to keep O2 sats 92% or greater.             Referrals     Occupational Therapy Adult Consult       Evaluate and treat as clinically indicated.    Reason:  Physical deconditioning.       Physical Therapy Adult Consult       Evaluate and treat as clinically indicated.    Reason:   Physical deconditioning.             Follow-Up Appointment Instructions     Future Labs/Procedures    Follow Up and recommended labs and tests     Comments:    Follow up with custodial physician.  The following labs/tests are recommended: cbc/bmp/inr.      Follow-Up Appointment Instructions     Follow Up and recommended labs and tests       Follow up with custodial physician.  The following labs/tests are recommended: cbc/bmp/inr.             Statement of Approval     Ordered          06/06/17 1203  I have reviewed and agree with all the recommendations and orders detailed in this document.  EFFECTIVE NOW     Approved and electronically signed by:  Eliseo Douglass DO

## 2017-06-02 ENCOUNTER — APPOINTMENT (OUTPATIENT)
Dept: PHYSICAL THERAPY | Facility: CLINIC | Age: 82
DRG: 175 | End: 2017-06-02
Attending: INTERNAL MEDICINE
Payer: MEDICARE

## 2017-06-02 LAB
ALBUMIN SERPL-MCNC: 2.5 G/DL (ref 3.4–5)
ALP SERPL-CCNC: 56 U/L (ref 40–150)
ALT SERPL W P-5'-P-CCNC: 15 U/L (ref 0–50)
ANION GAP SERPL CALCULATED.3IONS-SCNC: 6 MMOL/L (ref 3–14)
AST SERPL W P-5'-P-CCNC: 14 U/L (ref 0–45)
BILIRUB DIRECT SERPL-MCNC: 0.1 MG/DL (ref 0–0.2)
BILIRUB SERPL-MCNC: 0.3 MG/DL (ref 0.2–1.3)
BUN SERPL-MCNC: 32 MG/DL (ref 7–30)
CALCIUM SERPL-MCNC: 8.7 MG/DL (ref 8.5–10.1)
CHLORIDE SERPL-SCNC: 113 MMOL/L (ref 94–109)
CO2 SERPL-SCNC: 24 MMOL/L (ref 20–32)
CREAT SERPL-MCNC: 0.85 MG/DL (ref 0.52–1.04)
ERYTHROCYTE [DISTWIDTH] IN BLOOD BY AUTOMATED COUNT: 14.8 % (ref 10–15)
GFR SERPL CREATININE-BSD FRML MDRD: 62 ML/MIN/1.7M2
GLUCOSE SERPL-MCNC: 92 MG/DL (ref 70–99)
HCT VFR BLD AUTO: 32.9 % (ref 35–47)
HGB BLD-MCNC: 10.5 G/DL (ref 11.7–15.7)
INR PPP: 1.3 (ref 0.86–1.14)
LMWH PPP CHRO-ACNC: 0.53 IU/ML
MCH RBC QN AUTO: 32.5 PG (ref 26.5–33)
MCHC RBC AUTO-ENTMCNC: 31.9 G/DL (ref 31.5–36.5)
MCV RBC AUTO: 102 FL (ref 78–100)
PLATELET # BLD AUTO: 207 10E9/L (ref 150–450)
POTASSIUM SERPL-SCNC: 4.1 MMOL/L (ref 3.4–5.3)
PROT SERPL-MCNC: 6.4 G/DL (ref 6.8–8.8)
RBC # BLD AUTO: 3.23 10E12/L (ref 3.8–5.2)
SODIUM SERPL-SCNC: 143 MMOL/L (ref 133–144)
WBC # BLD AUTO: 17.2 10E9/L (ref 4–11)

## 2017-06-02 PROCEDURE — 97110 THERAPEUTIC EXERCISES: CPT | Mod: GP

## 2017-06-02 PROCEDURE — 85610 PROTHROMBIN TIME: CPT | Performed by: INTERNAL MEDICINE

## 2017-06-02 PROCEDURE — A9270 NON-COVERED ITEM OR SERVICE: HCPCS | Mod: GY | Performed by: INTERNAL MEDICINE

## 2017-06-02 PROCEDURE — 97161 PT EVAL LOW COMPLEX 20 MIN: CPT | Mod: GP

## 2017-06-02 PROCEDURE — 80048 BASIC METABOLIC PNL TOTAL CA: CPT | Performed by: INTERNAL MEDICINE

## 2017-06-02 PROCEDURE — 25000128 H RX IP 250 OP 636: Performed by: INTERNAL MEDICINE

## 2017-06-02 PROCEDURE — 85520 HEPARIN ASSAY: CPT | Performed by: INTERNAL MEDICINE

## 2017-06-02 PROCEDURE — 99233 SBSQ HOSP IP/OBS HIGH 50: CPT | Performed by: INTERNAL MEDICINE

## 2017-06-02 PROCEDURE — 25000132 ZZH RX MED GY IP 250 OP 250 PS 637: Mod: GY | Performed by: INTERNAL MEDICINE

## 2017-06-02 PROCEDURE — 85027 COMPLETE CBC AUTOMATED: CPT | Performed by: INTERNAL MEDICINE

## 2017-06-02 PROCEDURE — 80076 HEPATIC FUNCTION PANEL: CPT | Performed by: INTERNAL MEDICINE

## 2017-06-02 PROCEDURE — 12000000 ZZH R&B MED SURG/OB

## 2017-06-02 PROCEDURE — 40000193 ZZH STATISTIC PT WARD VISIT

## 2017-06-02 PROCEDURE — 97530 THERAPEUTIC ACTIVITIES: CPT | Mod: GP

## 2017-06-02 PROCEDURE — 36415 COLL VENOUS BLD VENIPUNCTURE: CPT | Performed by: INTERNAL MEDICINE

## 2017-06-02 RX ORDER — WARFARIN SODIUM 2.5 MG/1
2.5 TABLET ORAL
Status: COMPLETED | OUTPATIENT
Start: 2017-06-02 | End: 2017-06-02

## 2017-06-02 RX ADMIN — PSYLLIUM HUSK 1 PACKET: 3.4 POWDER ORAL at 11:05

## 2017-06-02 RX ADMIN — DOCUSATE SODIUM 300 MG: 100 CAPSULE, LIQUID FILLED ORAL at 21:06

## 2017-06-02 RX ADMIN — LEVOTHYROXINE SODIUM 88 MCG: 88 TABLET ORAL at 11:01

## 2017-06-02 RX ADMIN — METOPROLOL SUCCINATE 50 MG: 50 TABLET, EXTENDED RELEASE ORAL at 11:03

## 2017-06-02 RX ADMIN — ATORVASTATIN CALCIUM 10 MG: 10 TABLET, FILM COATED ORAL at 21:06

## 2017-06-02 RX ADMIN — WARFARIN SODIUM 2.5 MG: 2.5 TABLET ORAL at 17:24

## 2017-06-02 RX ADMIN — ACETAMINOPHEN 1000 MG: 500 TABLET, FILM COATED ORAL at 10:56

## 2017-06-02 RX ADMIN — POLYETHYLENE GLYCOL 3350 17 G: 17 POWDER, FOR SOLUTION ORAL at 10:59

## 2017-06-02 RX ADMIN — HEPARIN SODIUM 700 UNITS/HR: 10000 INJECTION, SOLUTION INTRAVENOUS at 17:24

## 2017-06-02 RX ADMIN — AMLODIPINE BESYLATE 2.5 MG: 2.5 TABLET ORAL at 11:02

## 2017-06-02 RX ADMIN — OMEPRAZOLE 20 MG: 20 CAPSULE, DELAYED RELEASE ORAL at 11:03

## 2017-06-02 RX ADMIN — ACETAMINOPHEN 1000 MG: 500 TABLET, FILM COATED ORAL at 21:06

## 2017-06-02 NOTE — PLAN OF CARE
Problem: Goal Outcome Summary  Goal: Goal Outcome Summary  Outcome: No Change  A&O x3, not sure of date.  VSS. On Tele. Lower extremities edematous. Regular diet. Coccyx with blanchable redness with hidla size area of abrasion and scabbing. Heels red. Turns with assist of 1-2, usually ambulates independently with walker. Chronic franks.  Scheduled tylenol given for right hip pain.

## 2017-06-02 NOTE — PLAN OF CARE
Problem: Goal Outcome Summary  Goal: Goal Outcome Summary  OT: Orders received and chart reviewed. Pt in bed when therapist arrived. Not receptive to session, reporting she just finished physical therapy. Will attempt again as schedule allows.

## 2017-06-02 NOTE — PROGRESS NOTES
06/02/17 1345   Quick Adds   Type of Visit Initial PT Evaluation   Living Environment   Lives With alone   Living Arrangements independent living facility   Home Accessibility no concerns   Number of Stairs to Enter Home 0   Number of Stairs Within Home 0   Self-Care   Usual Activity Tolerance fair   Current Activity Tolerance fair   Regular Exercise no   Equipment Currently Used at Home walker, rolling   Activity/Exercise/Self-Care Comment Pt gets assist with meals, meds, cleaning, laundry, driving, dressing. SBA for brushing teeth and getting ready in the AM and PM.   Functional Level Prior   Ambulation 1-->assistive equipment   Transferring 1-->assistive equipment   Fall history within last six months yes   Number of times patient has fallen within last six months 2   Which of the above functional risks had a recent onset or change? ambulation;transferring   General Information   Onset of Illness/Injury or Date of Surgery - Date 06/01/17   Referring Physician Sunita Gan MD   Patient/Family Goals Statement Return home   Pertinent History of Current Problem (include personal factors and/or comorbidities that impact the POC) Admitted with SOB, R sided chest pain, found to have a PE, acute kidney injury. PMH: diastolic dysfunction, TIA and  history of breast cancer.    Precautions/Limitations fall precautions   Weight-Bearing Status - LLE full weight-bearing   Weight-Bearing Status - RLE full weight-bearing   General Observations up in chair   Cognitive Status Examination   Orientation person   Level of Consciousness alert   Follows Commands and Answers Questions able to follow single-step instructions   Personal Safety and Judgment intact   Memory impaired   Pain Assessment   Patient Currently in Pain No   Integumentary/Edema   Integumentary/Edema Comments B LE 2+ edema   Posture    Posture Forward head position;Protracted shoulders;Kyphosis   Range of Motion (ROM)   ROM Quick Adds No deficits were identified  "  Strength   Strength Comments B hip flex 3+/5, knee ext 4/5, DF 3+/5   Bed Mobility   Bed Mobility Comments Sit to supine with SBA   Transfer Skills   Transfer Comments Sit to stand wtih min A and FWW   Gait   Gait Comments Pt amb 2 ft chair to bed with FWW and CGA   Balance   Balance Comments Balance steady with walker   General Therapy Interventions   Planned Therapy Interventions bed mobility training;gait training;strengthening;transfer training   Clinical Impression   Criteria for Skilled Therapeutic Intervention yes, treatment indicated   PT Diagnosis Difficulty ambulating   Influenced by the following impairments Dec strength, balance, activity tolerance   Functional limitations due to impairments Difficulty ambulating and transferring   Clinical Presentation Stable/Uncomplicated   Clinical Presentation Rationale medically stable   Clinical Decision Making (Complexity) Low complexity   Therapy Frequency` daily   Predicted Duration of Therapy Intervention (days/wks) 4 days   Anticipated Discharge Disposition Transitional Care Facility   Risk & Benefits of therapy have been explained Yes   Patient, Family & other staff in agreement with plan of care Yes   [facility] \"6 Clicks\"   2016, Trustees of [facility], under license to [employer].  All rights reserved.   6 Clicks Short Forms Basic Mobility Inpatient Short Form   [facility]  \"6 Clicks\" V.2 Basic Mobility Inpatient Short Form   1. Turning from your back to your side while in a flat bed without using bedrails? 4 - None   2. Moving from lying on your back to sitting on the side of a flat bed without using bedrails? 3 - A Little   3. Moving to and from a bed to a chair (including a wheelchair)? 3 - A Little   4. Standing up from a chair using your arms (e.g., wheelchair, or bedside chair)? 3 - A Little   5. To walk in hospital room? 3 - A Little   6. Climbing 3-5 steps with a railing? 2 - A Lot   Basic Mobility Raw " Score (Score out of 24.Lower scores equate to lower levels of function) 18   Total Evaluation Time   Total Evaluation Time (Minutes) 15

## 2017-06-02 NOTE — PLAN OF CARE
Problem: Goal Outcome Summary  Goal: Goal Outcome Summary  Outcome: No Change  L&Ox3. VSS. Denies pain. LS dim. Heparin gtt restarted at 2345 at 700 units/hr. T/R q2 h. Chronic franks. UOP good.

## 2017-06-02 NOTE — PLAN OF CARE
Problem: Goal Outcome Summary  Goal: Goal Outcome Summary  Outcome: Improving  A&Ox3-4.  Forgetful.  [place].  VSS.  Afebrile.  O2sats-high 90s on 1 LO2.  Up with 2, GB & walker to chair.  Up in chair for meals. LS-diminished.  On Heparin drip infusing at 700 Units/hr.  Denies any pain/discomfort.  Hep 10a-0.53.  Tolerating regular diet.  Tele-SD.  Mepilex to coccyx-CDI.  Hidalgo catheter intact.

## 2017-06-02 NOTE — PROGRESS NOTES
SPIRITUAL HEALTH SERVICES  Spiritual Assessment Progress Note  [facility]   attempted to visit with pt multiple times but she was with other staff.    Pt is [place] and a referral was made to [facility].     Please put in another referral if  additional visits are requested.                                                                                                                                                   Snehal Valenzuela M.Div., [facility]  Staff    Pager [phone]

## 2017-06-02 NOTE — PLAN OF CARE
Problem: Goal Outcome Summary  Goal: Goal Outcome Summary  PT: Orders received, eval completed, treatment initiated. Pt admitted with SOB, R sided chest pain and found to have a PE and acute kidney injury. Prior to admit pt was living alone in a senior independent apartment. Pt has assist with meals, meds, cleaning, laundry, dressing, SBA for basic ADLs and getting ready in AM/PM per pt. Pt walks to meals in the dining room independently for all 3 meals.  Pt demonstrates dec strength, balance, activity tolerance, SOB and difficulty ambulating and transferring and would benefit from skilled PT services in order to improve this.      Discharge Planner PT   Patient plan for discharge: Home     Current status: Currently requires min A sit to stand with FWW, CGA for gait of 2 ft bed to chair with FWW, SBA sit to supine but dependent assist to scoot up in bed. Participated in seated strengthening activities.     Barriers to return to prior living situation: Needs assist with mobility, falls risk.     Recommendations for discharge: TCU at this time. Pending improvement and independence with mobility pt may be able to return home with home PT.     Rationale for recommendations: See barriers.       Entered by: Kimberley Shields 06/02/2017 2:30 PM

## 2017-06-02 NOTE — PROGRESS NOTES
Care Transition Initial Assessment -   Reason For Consult: discharge planning, facility placement  Met with: Son and dtr in law    Active Problems:    Pulmonary embolism (H)         DATA  Lives With: alone  Living Arrangements: independent living facility-[facility]  Description of Support System: Involved, Supportive  Who is your support system?: Children  Identified issues/concerns regarding health management: PT/OT recommend TCU. Family in agreement.  faxed a referral via DOD to Rodríguez at [facility]. He will have a bed for pt in the TCU when ready. They would like pt to have a 3-day stay but it is not required for TCU coverage at their facility.      ASSESSMENT  Cognitive Status:  Family making decisions on pts behalf.  Concerns to be addressed: On-going DC planning.     PLAN  Financial costs for the patient includes: N/A.  Patient given options and choices for discharge: Yes.  Patient/family is agreeable to the plan? YES  Patient Goals and Preferences: DC to [facility] TCU.  Patient anticipates discharging to:  TCU.     Cherry Escalona, BOB  [phone]

## 2017-06-03 ENCOUNTER — APPOINTMENT (OUTPATIENT)
Dept: PHYSICAL THERAPY | Facility: CLINIC | Age: 82
DRG: 175 | End: 2017-06-03
Payer: MEDICARE

## 2017-06-03 LAB
ANION GAP SERPL CALCULATED.3IONS-SCNC: 8 MMOL/L (ref 3–14)
BUN SERPL-MCNC: 23 MG/DL (ref 7–30)
CALCIUM SERPL-MCNC: 9.2 MG/DL (ref 8.5–10.1)
CHLORIDE SERPL-SCNC: 112 MMOL/L (ref 94–109)
CO2 SERPL-SCNC: 25 MMOL/L (ref 20–32)
CREAT SERPL-MCNC: 0.74 MG/DL (ref 0.52–1.04)
ERYTHROCYTE [DISTWIDTH] IN BLOOD BY AUTOMATED COUNT: 14.9 % (ref 10–15)
GFR SERPL CREATININE-BSD FRML MDRD: 72 ML/MIN/1.7M2
GLUCOSE SERPL-MCNC: 86 MG/DL (ref 70–99)
HCT VFR BLD AUTO: 33.5 % (ref 35–47)
HGB BLD-MCNC: 10.7 G/DL (ref 11.7–15.7)
INR PPP: 1.5 (ref 0.86–1.14)
LMWH PPP CHRO-ACNC: 0.5 IU/ML
MCH RBC QN AUTO: 32.6 PG (ref 26.5–33)
MCHC RBC AUTO-ENTMCNC: 31.9 G/DL (ref 31.5–36.5)
MCV RBC AUTO: 102 FL (ref 78–100)
PLATELET # BLD AUTO: 238 10E9/L (ref 150–450)
POTASSIUM SERPL-SCNC: 4.2 MMOL/L (ref 3.4–5.3)
RBC # BLD AUTO: 3.28 10E12/L (ref 3.8–5.2)
SODIUM SERPL-SCNC: 145 MMOL/L (ref 133–144)
WBC # BLD AUTO: 9.7 10E9/L (ref 4–11)

## 2017-06-03 PROCEDURE — 85520 HEPARIN ASSAY: CPT | Performed by: INTERNAL MEDICINE

## 2017-06-03 PROCEDURE — 25000132 ZZH RX MED GY IP 250 OP 250 PS 637: Mod: GY | Performed by: INTERNAL MEDICINE

## 2017-06-03 PROCEDURE — 36415 COLL VENOUS BLD VENIPUNCTURE: CPT | Performed by: INTERNAL MEDICINE

## 2017-06-03 PROCEDURE — 40000141 ZZH STATISTIC PERIPHERAL IV START W/O US GUIDANCE

## 2017-06-03 PROCEDURE — 40000894 ZZH STATISTIC OT IP EVAL DEFER: Performed by: OCCUPATIONAL THERAPIST

## 2017-06-03 PROCEDURE — 25000132 ZZH RX MED GY IP 250 OP 250 PS 637: Mod: GY

## 2017-06-03 PROCEDURE — 99233 SBSQ HOSP IP/OBS HIGH 50: CPT | Performed by: INTERNAL MEDICINE

## 2017-06-03 PROCEDURE — A9270 NON-COVERED ITEM OR SERVICE: HCPCS | Mod: GY | Performed by: INTERNAL MEDICINE

## 2017-06-03 PROCEDURE — 85610 PROTHROMBIN TIME: CPT | Performed by: INTERNAL MEDICINE

## 2017-06-03 PROCEDURE — 85027 COMPLETE CBC AUTOMATED: CPT | Performed by: INTERNAL MEDICINE

## 2017-06-03 PROCEDURE — 40000193 ZZH STATISTIC PT WARD VISIT: Performed by: PHYSICAL THERAPIST

## 2017-06-03 PROCEDURE — 97110 THERAPEUTIC EXERCISES: CPT | Mod: GP | Performed by: PHYSICAL THERAPIST

## 2017-06-03 PROCEDURE — A9270 NON-COVERED ITEM OR SERVICE: HCPCS | Mod: GY

## 2017-06-03 PROCEDURE — 99207 ZZC CDG-MDM COMPONENT: MEETS MODERATE - UP CODED: CPT | Performed by: INTERNAL MEDICINE

## 2017-06-03 PROCEDURE — 97116 GAIT TRAINING THERAPY: CPT | Mod: GP | Performed by: PHYSICAL THERAPIST

## 2017-06-03 PROCEDURE — 80048 BASIC METABOLIC PNL TOTAL CA: CPT | Performed by: INTERNAL MEDICINE

## 2017-06-03 PROCEDURE — 25000125 ZZHC RX 250: Performed by: INTERNAL MEDICINE

## 2017-06-03 PROCEDURE — 12000000 ZZH R&B MED SURG/OB

## 2017-06-03 RX ORDER — WARFARIN SODIUM 2.5 MG/1
2.5 TABLET ORAL
Status: COMPLETED | OUTPATIENT
Start: 2017-06-03 | End: 2017-06-03

## 2017-06-03 RX ADMIN — METOPROLOL SUCCINATE 50 MG: 50 TABLET, EXTENDED RELEASE ORAL at 08:53

## 2017-06-03 RX ADMIN — PSYLLIUM HUSK 1 PACKET: 3.4 POWDER ORAL at 08:52

## 2017-06-03 RX ADMIN — ACETAMINOPHEN 1000 MG: 500 TABLET, FILM COATED ORAL at 08:53

## 2017-06-03 RX ADMIN — ACETAMINOPHEN 1000 MG: 500 TABLET, FILM COATED ORAL at 20:35

## 2017-06-03 RX ADMIN — OMEPRAZOLE 20 MG: 20 CAPSULE, DELAYED RELEASE ORAL at 08:52

## 2017-06-03 RX ADMIN — DOCUSATE SODIUM 300 MG: 100 CAPSULE, LIQUID FILLED ORAL at 20:35

## 2017-06-03 RX ADMIN — ONDANSETRON 4 MG: 4 TABLET, ORALLY DISINTEGRATING ORAL at 12:30

## 2017-06-03 RX ADMIN — LEVOTHYROXINE SODIUM 88 MCG: 88 TABLET ORAL at 08:53

## 2017-06-03 RX ADMIN — ATORVASTATIN CALCIUM 10 MG: 10 TABLET, FILM COATED ORAL at 21:16

## 2017-06-03 RX ADMIN — AMLODIPINE BESYLATE 2.5 MG: 2.5 TABLET ORAL at 08:52

## 2017-06-03 RX ADMIN — WARFARIN SODIUM 2.5 MG: 2.5 TABLET ORAL at 18:27

## 2017-06-03 NOTE — PLAN OF CARE
Problem: Goal Outcome Summary  Goal: Goal Outcome Summary  Outcome: No Change  A/O x 3; forgetful, [place].  VSS on RA.  Tele- NSR.  O2 sat. 90-92 throughout the night.  LS-diminished x all lobes; infrequent dry cough.  +3 BLE.  Hidalgo-patent.  Multiple wounds, see chart.  Assist x 1, walker/gait belt.  Heparin infusing, no active signs of bleeding.  Hidalgo patent. Possible D/C to TCU 1-2 days.

## 2017-06-03 NOTE — PLAN OF CARE
Problem: Goal Outcome Summary  Goal: Goal Outcome Summary  Discharge Planner PT   Patient plan for discharge: Home     Current status: Patient performed supine-sit requiring Carly. Sit <> stand with use of FWW, CGA. Patient ambulated 65' with FWW and CGA, noted O2 sats on RA decreased to 88% after ambulation, increased to 90% after cues for PLB. Patient tolerated seated strengthening exercises, noted O2 sats on RA decreased to 86%. Patient agreeable to sit up in chair at end of session.      Barriers to return to prior living situation: Lives alone, Fall Risk, Requiring assist for limited functional mobility     Recommendations for discharge: TCU     Rationale for recommendations: To improve activity tolerance and safety with functional mobility prior to returning home alone.        Entered by: Loli Haile 06/03/2017 12:43 PM

## 2017-06-03 NOTE — PLAN OF CARE
Problem: Goal Outcome Summary  Goal: Goal Outcome Summary  OT: Following review of chart and consult with staff and pt, OT eval being deferred to next level of care. Pt discharging to TCU for increased safety and I in mobility, pt needs met by PT in IP setting. Orders completed

## 2017-06-03 NOTE — PLAN OF CARE
Problem: Goal Outcome Summary  Goal: Goal Outcome Summary  Outcome: Improving  Pt A&O x2-3. [place], hearing aids in place. Easy to reorient. VSS. Pt on RA with SaO2 in the 90s. Lungs diminished. Heparin IV infusing at 7ml/hr. INR 1.5 this AM. Warfarin to be started this evening. BLE edema. Pt is 1-2p assist. 1 BM this shift. Up in chair for meals. Some nausea and gas at noon after eating, Zofran 4mg PO given. Mepilex boarder on coccyx in place. Some redness noted, but blanchable. Heels lifted for red, blanchable spots. Family updated on progress.

## 2017-06-03 NOTE — PLAN OF CARE
Problem: Goal Outcome Summary  Goal: Goal Outcome Summary  Outcome: Improving  Pt A/O#3, forgetful, calm/cooperative, VSS on RA, denies pain, up w/1-2/GB/walker to BR/chair, fair po, Hidalgo patent, Heparin gtt infusing at 700u/h until am, continues on Coumadin, tele-SR w/1st degree AVB, coccyx dressing D/C/I, PT following, d/c to TCU pending progress.

## 2017-06-03 NOTE — PROGRESS NOTES
Patient asleep & O2 sat machine beeping, sats down to 76%, applied 2 liters nasal cannula.  When patient is awake her sats have been 90-93%.

## 2017-06-03 NOTE — PROGRESS NOTES
[facility]    Hospitalist Progress Note    Date of Service (when I saw the patient): 06/03/2017    Assessment & Plan   Christy Rueda is a 89 year-old female with a past medical history of hypertension, hyperlipidemia, transient ischemic attack, history of left-sided breast cancer status post mastectomy, chronic Hidalgo catheter due to urinary retention and hypothyroidism who presents with shortness of breath and right-sided chest pain admitted on 6/1/2017 after being diagnosed with pulmonary embolism.     Acute right-sided pulmonary emboli  Presented with right-sided chest pain and shortness of breath. Generally has a sedentary lifestyle with further decrease in activity recently. Also with history of breast cancer s/p mastectomy and chemotherapy ~15-20 years ago, now with innumerable pulmonary nodules as noted below, can not rule out metastatic disease, may contribute to propensity for clotting. Risks/benefits conversation held on admission by admitting provider, discussed again 6/2/2017. Family agreeable to anticoagulation. LE US negative for DVT. Discussed with family echocardiogram as part of typical PE work-up. As LE US negative for DVT would be unlikely to pursue IVC filter placement with any RV dysfunction, especially given her age and questionable utility of this intervention when anticoagulation is not contraindicated. Family agreeable to forgo. Note troponin negative, NtpBNP within referance range.  - Continue heparin drip as bridge to warfarin.   - Continue warfarin with pharmacy to dose   - At this point would plan for indefinite anticoagulation, however can be reassessed if she has high risks events such as falls at home that make risk of bleeding unacceptably high.     Innumerable bilateral pulmonary nodules  Indeterminate in nature on CT. Can not exclude metastatic disease, does have history of breast cancer. Discussed with family. At this point would not pursue any systemic  treatment if diagnosed with metastatic disease, agreeable to forgo any additional testing or serial imaging as will not .    Acute kidney injury, suspect pre-renal    Creatinine is mildly elevated at 1.29 on admission from baseline around 1.1. BUN elevated as well, >20:1.   - Creatinine improved to below baseline with IV fluids, consistent with pre-renal etiology  - Continues to remain at or below baseline off of IV fluids  - Monitor BMP    Nausea  Noted after lunch 6/3/2017. Per family, she has eaten significantly more 6/3/17 than is her usual. Having BM. Non-tender on exam.   - Anti-emetics  - Encourage smaller meals   - Continue to monitor     Abdominal tenderness  Noted on admission, none present at this time. LFTs unremarkable. Unclear etiology, but appears resolved    Leukocytosis  Pyuria   LFTs unremarkable. UA mildly abnormal, difficult to interpret in setting of chronic catheterization. UC with 10-50,000 GNR. Otherwise without suprapubic tenderness or other systemic signs of infection such as fever.   - WBC resolved to normal range in absence of antibiotics, would continue to hold any antibiotic treatment as suspect her UA and UC results are secondary to chronic catheterization rather than true infection. Can reconsider if any fevers or new symptoms to suspect infection    Hyperlipidemia  Continue prior to admission statin.     Hypertension  - Blood pressure adequately controlled. Continue prior to admission metoprolol, amlodipine.     History of TIA  Aspirin has been discontinued given initiation of formal anticoagulation     Chronic pain  Continue prior to admission acetaminophen twice daily and as needed.     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   - Continue Hidalgo catheter    DVT Prophylaxis: Heparin drip   Code Status: DNR/DNI    Disposition: Expected discharge to TCU 6/4/17 pending resolution of nausea, tolerating  diet, remains off of oxygen. Anticipate will need enoxaparin bridging on discharge.     Soren GARETHBebeto Keshiazak    Interval History   No acute events overnight. Ate a large lunch and subsequently had belching associated with nausea. Per RN had BM this morning. Patient not able to state when she last passed gas. Denies any chest pain or shortness of breath.     -Data reviewed today: I reviewed all new labs and imaging results over the last 24 hours. I personally reviewed no images or EKG's today.    Physical Exam   Temp: 98  F (36.7  C) Temp src: Oral BP: 166/84 Pulse: 60 Heart Rate: 69 Resp: 16 SpO2: 90 % O2 Device: (P) None (Room air)    Vitals:    06/01/17 1220 06/02/17 0613 06/03/17 0500   Weight: 47.6 kg (105 lb) 48.4 kg (106 lb 11.2 oz) 50.2 kg (110 lb 10.7 oz)     Vital Signs with Ranges  Temp:  [97.4  F (36.3  C)-98  F (36.7  C)] 98  F (36.7  C)  Pulse:  [60] 60  Heart Rate:  [67-69] 69  Resp:  [16-18] 16  BP: (123-166)/(65-84) 166/84  SpO2:  [90 %-92 %] 90 %  I/O last 3 completed shifts:  In: 413 [P.O.:100; I.V.:313]  Out: 775 [Urine:775]    Constitutional: Elderly female, NAD  Respiratory: Clear to auscultation bilaterally, good air movement bilaterally  Cardiovascular: RRR, no m/r/g. 2+ pitting edema to mid-shin   GI: Soft, non-tender, non-distended. BS present.   Skin/Integumen: Warm, dry  Neuro: Alert. Very [place]. Following commands.     Medications     HEParin 700 Units/hr (06/02/17 1724)     - MEDICATION INSTRUCTIONS -       Warfarin Therapy Reminder         warfarin  2.5 mg Oral ONCE at 18:00     sodium chloride (PF)  3 mL Intracatheter Q8H     acetaminophen (TYLENOL) tablet 1,000 mg  1,000 mg Oral BID     amLODIPine (NORVASC) tablet 2.5 mg  2.5 mg Oral Daily     atorvastatin (LIPITOR) tablet 10 mg  10 mg Oral At Bedtime     docusate sodium (COLACE) capsule 300 mg  300 mg Oral QPM     levothyroxine (SYNTHROID/LEVOTHROID) tablet 88 mcg  88 mcg Oral Daily     metoprolol (TOPROL-XL) 24 hr tablet 50 mg  50 mg Oral  Daily     omeprazole (priLOSEC) CR capsule 20 mg  20 mg Oral Daily     polyethylene glycol  17 g Oral Q Mon Wed Fri AM     psyllium  1 packet Oral Daily       Data     Recent Labs  Lab 06/03/17  0814 06/02/17  0544 06/01/17  1059   WBC 9.7 17.2* 15.8*   HGB 10.7* 10.5* 11.4*   * 102* 101*    207 246   INR 1.50* 1.30* 1.05   * 143 142   POTASSIUM 4.2 4.1 4.1   CHLORIDE 112* 113* 107   CO2 25 24 27   BUN 23 32* 42*   CR 0.74 0.85 1.29*   ANIONGAP 8 6 8   NAJMA 9.2 8.7 9.7   GLC 86 92 117*   ALBUMIN  --  2.5*  --    PROTTOTAL  --  6.4*  --    BILITOTAL  --  0.3  --    ALKPHOS  --  56  --    ALT  --  15  --    AST  --  14  --    TROPI  --   --  <0.015The 99th percentile for upper reference range is 0.045 ug/L.  Troponin values in the range of 0.045 - 0.120 ug/L may be associated with risks of adverse clinical events.       No results found for this or any previous visit (from the past 24 hour(s)).

## 2017-06-04 ENCOUNTER — APPOINTMENT (OUTPATIENT)
Dept: GENERAL RADIOLOGY | Facility: CLINIC | Age: 82
DRG: 175 | End: 2017-06-04
Attending: INTERNAL MEDICINE
Payer: MEDICARE

## 2017-06-04 LAB
BACTERIA SPEC CULT: ABNORMAL
INR PPP: 1.88 (ref 0.86–1.14)
LMWH PPP CHRO-ACNC: 0.48 IU/ML
MICRO REPORT STATUS: ABNORMAL
MICROORGANISM SPEC CULT: ABNORMAL
SODIUM SERPL-SCNC: 146 MMOL/L (ref 133–144)
SPECIMEN SOURCE: ABNORMAL

## 2017-06-04 PROCEDURE — 84295 ASSAY OF SERUM SODIUM: CPT | Performed by: INTERNAL MEDICINE

## 2017-06-04 PROCEDURE — 85610 PROTHROMBIN TIME: CPT | Performed by: INTERNAL MEDICINE

## 2017-06-04 PROCEDURE — 25000132 ZZH RX MED GY IP 250 OP 250 PS 637: Mod: GY

## 2017-06-04 PROCEDURE — 25000128 H RX IP 250 OP 636: Performed by: INTERNAL MEDICINE

## 2017-06-04 PROCEDURE — 71020 XR CHEST 2 VW: CPT

## 2017-06-04 PROCEDURE — 36415 COLL VENOUS BLD VENIPUNCTURE: CPT | Performed by: INTERNAL MEDICINE

## 2017-06-04 PROCEDURE — A9270 NON-COVERED ITEM OR SERVICE: HCPCS | Mod: GY

## 2017-06-04 PROCEDURE — 99233 SBSQ HOSP IP/OBS HIGH 50: CPT | Performed by: INTERNAL MEDICINE

## 2017-06-04 PROCEDURE — 25000128 H RX IP 250 OP 636

## 2017-06-04 PROCEDURE — 85520 HEPARIN ASSAY: CPT | Performed by: INTERNAL MEDICINE

## 2017-06-04 PROCEDURE — A9270 NON-COVERED ITEM OR SERVICE: HCPCS | Mod: GY | Performed by: INTERNAL MEDICINE

## 2017-06-04 PROCEDURE — 25000132 ZZH RX MED GY IP 250 OP 250 PS 637: Mod: GY | Performed by: INTERNAL MEDICINE

## 2017-06-04 PROCEDURE — 12000000 ZZH R&B MED SURG/OB

## 2017-06-04 RX ORDER — FUROSEMIDE 10 MG/ML
20 INJECTION INTRAMUSCULAR; INTRAVENOUS ONCE
Status: COMPLETED | OUTPATIENT
Start: 2017-06-04 | End: 2017-06-04

## 2017-06-04 RX ORDER — WARFARIN SODIUM 2.5 MG/1
2.5 TABLET ORAL
Status: COMPLETED | OUTPATIENT
Start: 2017-06-04 | End: 2017-06-04

## 2017-06-04 RX ADMIN — WARFARIN SODIUM 2.5 MG: 2.5 TABLET ORAL at 18:06

## 2017-06-04 RX ADMIN — LEVOTHYROXINE SODIUM 88 MCG: 88 TABLET ORAL at 09:39

## 2017-06-04 RX ADMIN — ATORVASTATIN CALCIUM 10 MG: 10 TABLET, FILM COATED ORAL at 21:07

## 2017-06-04 RX ADMIN — PSYLLIUM HUSK 1 PACKET: 3.4 POWDER ORAL at 09:38

## 2017-06-04 RX ADMIN — ENOXAPARIN SODIUM 50 MG: 60 INJECTION SUBCUTANEOUS at 20:55

## 2017-06-04 RX ADMIN — METOPROLOL SUCCINATE 50 MG: 50 TABLET, EXTENDED RELEASE ORAL at 09:39

## 2017-06-04 RX ADMIN — AMLODIPINE BESYLATE 2.5 MG: 2.5 TABLET ORAL at 09:38

## 2017-06-04 RX ADMIN — OMEPRAZOLE 20 MG: 20 CAPSULE, DELAYED RELEASE ORAL at 09:39

## 2017-06-04 RX ADMIN — FUROSEMIDE 20 MG: 10 INJECTION, SOLUTION INTRAVENOUS at 14:41

## 2017-06-04 RX ADMIN — ACETAMINOPHEN 1000 MG: 500 TABLET, FILM COATED ORAL at 09:38

## 2017-06-04 RX ADMIN — HEPARIN SODIUM 700 UNITS/HR: 10000 INJECTION, SOLUTION INTRAVENOUS at 05:56

## 2017-06-04 RX ADMIN — ACETAMINOPHEN 1000 MG: 500 TABLET, FILM COATED ORAL at 21:01

## 2017-06-04 RX ADMIN — DOCUSATE SODIUM 300 MG: 100 CAPSULE, LIQUID FILLED ORAL at 21:01

## 2017-06-04 NOTE — PLAN OF CARE
Problem: Goal Outcome Summary  Goal: Goal Outcome Summary  Outcome: No Change  Pt A&Ox2/3. Pt more tired today. VSS except O2 needs. SaO2 in the 70s-80s on RA. Diminished in bases, shallow depth. Pt encouraged to CDB with mild success. SaO2 92% on 2L via NC. Chest Xray performed at 1200 and positive for pulmonary edema. IV Heparin infusing at 7ml/hr. Warfarin started last night. Lovenox to be ordered. IV lasix given. DC cancelled.

## 2017-06-04 NOTE — PLAN OF CARE
Problem: Goal Outcome Summary  Goal: Goal Outcome Summary  Outcome: No Change  Pt is A&Ox2, [place], VSS on 2L via NC. Up with one assist walker and gait belt. Up in chair for meals. Denied pain or SOB. BLE edematous. Small appetite, Hidalgo patent. LS diminished. Hep drip continued, warfarin started this shift. Continue to monitor.

## 2017-06-04 NOTE — PROGRESS NOTES
[facility]    Hospitalist Progress Note    Date of Service (when I saw the patient): 06/04/2017    Assessment & Plan   Christy Rueda is a 89 year-old female with a past medical history of hypertension, hyperlipidemia, transient ischemic attack, history of left-sided breast cancer status post mastectomy, chronic Hidalgo catheter due to urinary retention and hypothyroidism who presents with shortness of breath and right-sided chest pain admitted on 6/1/2017 after being diagnosed with pulmonary embolism.    Acute right-sided pulmonary emboli  Presented with right-sided chest pain and shortness of breath. Generally has a sedentary lifestyle with further decrease in activity recently. Also with history of breast cancer s/p mastectomy and chemotherapy ~15-20 years ago, now with innumerable pulmonary nodules as noted below, can not rule out metastatic disease, may contribute to propensity for clotting. Risks/benefits conversation regarding anticoagulation held during admission. Family agreeable to anticoagulation. LE US negative for DVT. Discussed with family echocardiogram as part of typical PE work-up. As LE US negative for DVT would be unlikely to pursue IVC filter placement with any RV dysfunction, especially given her age and questionable utility of this intervention when anticoagulation is not contraindicated. Family agreeable to forgo. Note troponin negative, NtpBNP within referance range.  - Transition from heparin drip to enoxaparin SQ in anticipation of bridging for discharge  - Continue warfarin with pharmacy to dose   - At this point would plan for indefinite anticoagulation, however can be reassessed if she has high risks events such as falls at home that make risk of bleeding unacceptably high, hopefully after at least a 3 month course.     Innumerable bilateral pulmonary nodules  Indeterminate in nature on CT. Can not exclude metastatic disease, does have history of breast cancer.  Discussed with family. At this point would not pursue any systemic treatment if diagnosed with metastatic disease, agreeable to forgo any additional testing or serial imaging as will not .    Pulmonary edema  Noted to require oxygen overnight, and more consistent oxygen needs 6/4/2017.   - CXR with diffuse bilateral pulmonary opacities suggestive of edema. May be iatrogenic in nature secondary to previous IV fluids, no other clear provocative factors. Last echocardiogram in 2013 with EF 65-70% with moderate diastolic dysfunction. Have deferred repeat echocardiogram for PE as discussed above.  - Give furosemide IV 20 mg x1, will not scheduled diuretics so BMP can be followed up 6/5/17 given recent RAPHAEL  - Wean oxygen as able    Acute kidney injury, suspect pre-renal    Creatinine is mildly elevated at 1.29 on admission from baseline around 1.1. BUN elevated as well, >20:1.   - Creatinine improved to below baseline with IV fluids, consistent with pre-renal etiology  - Concern for pulmonary edema as above, monitor BMP closely with diuresis     Nausea  Noted after lunch 6/3/2017. Per family, she has eaten significantly more 6/3/17 than is her usual. Having BM. Non-tender on exam.  - Anti-emetics PRN  - Encouraged smaller meals  - Resolved 6/4/2017    Abdominal tenderness  Noted on admission, none present at this time. LFTs unremarkable. Unclear etiology, but appears to have resolved.    Leukocytosis  Pyuria  LFTs unremarkable. UA mildly abnormal, difficult to interpret in setting of chronic catheterization. UC with 10-50,000 E coli. Otherwise without suprapubic tenderness or other systemic signs of infection such as fever. WBC resolved to normal range in absence of antibiotics, would continue to hold any antibiotic treatment as suspect her UA and UC results are secondary to chronic catheterization rather than true infection. Can reconsider if any fevers or new symptoms to suspect  infection    Hyperlipidemia  Continue prior to admission statin.     Hypertension  - Blood pressure adequately controlled. Continue prior to admission metoprolol, amlodipine.     History of TIA  Aspirin has been discontinued given initiation of formal anticoagulation     Chronic pain  Continue prior to admission acetaminophen twice daily and as needed.     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   - Continue Hidalgo catheter    Advanced care planning  - Family with questions 6/4/2017 about if she should be brought back to hospital if respiratory issue recurs at TCU, versus made comfortable at facility. Introduced role of hospice to family, including likely ability for hospice services to be delivered in patient's current home setting if this is what they choose. Discussed that with present plan for discharge to TCU and rehabilitation, would not be hospice appropriate, but encouraged them to discuss this at any time at TCU to arrange a formal meeting with hospice team if they elect to focus purely on comfort in the future.    DVT Prophylaxis: Heparin drip with transition to enoxaparin + warfarin.  Code Status: DNR/DNI    Disposition: Anticipating discharge to TCU, on hold given CXR findings and IV diuretics. Reassess 6/5/17. Discussed with family at bedside, RN, CM/SW.    Soren Schmittzak    Interval History   Noted to be hypoxic overnight and placed on 2L of nasal cannula. Somewhat more fatigued today per family, but she denies any acute complaints including chest pain, shortness of breath.     -Data reviewed today: I reviewed all new labs and imaging results over the last 24 hours. I personally reviewed no images or EKG's today.    Physical Exam   Temp: 98.5  F (36.9  C) Temp src: Oral BP: 132/61 Pulse: 71 Heart Rate: 66 Resp: 16 SpO2: 90 % O2 Device: Nasal cannula Oxygen Delivery: 2 LPM  Vitals:    06/02/17 0613 06/03/17 0500 06/04/17 0548   Weight:  48.4 kg (106 lb 11.2 oz) 50.2 kg (110 lb 10.7 oz) 51.5 kg (113 lb 8.6 oz)     Vital Signs with Ranges  Temp:  [98  F (36.7  C)-98.5  F (36.9  C)] 98.5  F (36.9  C)  Pulse:  [64-71] 71  Heart Rate:  [59-77] 66  Resp:  [15-20] 16  BP: (112-132)/(58-61) 132/61  SpO2:  [76 %-96 %] 90 %  I/O last 3 completed shifts:  In: 699 [P.O.:560; I.V.:139]  Out: 550 [Urine:550]    Constitutional: Elderly female, NAD  Respiratory: Diminished at bilateral bases, somewhat shallow effort  Cardiovascular: RRR, no m/r/g. 2+ pitting edema to mid-shin   GI: Soft, non-tender, non-distended. BS present.   Skin/Integumen: Warm, dry  Neuro: Alert. Very [place]. Following commands.     Medications     HEParin 700 Units/hr (06/04/17 0556)     - MEDICATION INSTRUCTIONS -       Warfarin Therapy Reminder         warfarin  2.5 mg Oral ONCE at 18:00     enoxaparin  1 mg/kg Subcutaneous Q12H     sodium chloride (PF)  3 mL Intracatheter Q8H     acetaminophen (TYLENOL) tablet 1,000 mg  1,000 mg Oral BID     amLODIPine (NORVASC) tablet 2.5 mg  2.5 mg Oral Daily     atorvastatin (LIPITOR) tablet 10 mg  10 mg Oral At Bedtime     docusate sodium (COLACE) capsule 300 mg  300 mg Oral QPM     levothyroxine (SYNTHROID/LEVOTHROID) tablet 88 mcg  88 mcg Oral Daily     metoprolol (TOPROL-XL) 24 hr tablet 50 mg  50 mg Oral Daily     omeprazole (priLOSEC) CR capsule 20 mg  20 mg Oral Daily     polyethylene glycol  17 g Oral Q Mon Wed Fri AM     psyllium  1 packet Oral Daily       Data     Recent Labs  Lab 06/04/17  0950 06/04/17  0757 06/03/17  0814 06/02/17  0544 06/01/17  1059   WBC  --   --  9.7 17.2* 15.8*   HGB  --   --  10.7* 10.5* 11.4*   MCV  --   --  102* 102* 101*   PLT  --   --  238 207 246   INR  --  1.88* 1.50* 1.30* 1.05   *  --  145* 143 142   POTASSIUM  --   --  4.2 4.1 4.1   CHLORIDE  --   --  112* 113* 107   CO2  --   --  25 24 27   BUN  --   --  23 32* 42*   CR  --   --  0.74 0.85 1.29*   ANIONGAP  --   --  8 6 8   NAJMA  --   --  9.2 8.7 9.7    GLC  --   --  86 92 117*   ALBUMIN  --   --   --  2.5*  --    PROTTOTAL  --   --   --  6.4*  --    BILITOTAL  --   --   --  0.3  --    ALKPHOS  --   --   --  56  --    ALT  --   --   --  15  --    AST  --   --   --  14  --    TROPI  --   --   --   --  <0.015The 99th percentile for upper reference range is 0.045 ug/L.  Troponin values in the range of 0.045 - 0.120 ug/L may be associated with risks of adverse clinical events.       Recent Results (from the past 24 hour(s))   XR Chest 2 Views    Narrative    XR CHEST 2 VW 6/4/2017 1:16 PM    HISTORY: Hypoxia.    COMPARISON: June 1, 2017.      Impression    IMPRESSION: There are diffuse bilateral pulmonary opacities,  suggestive of edema, with likely small bilateral effusions. No  pneumothorax appreciated. Cardiomegaly as before. Lateral views  demonstrate multiple wedge compression deformities in the upper  thoracic spine as before.    HERMINIO ALVAREZ MD

## 2017-06-04 NOTE — PROGRESS NOTES
D: SATHYA following for DC planning. Pt not ready for DC to TCU today.  I: SATHYA updated Rodríguez at [facility]. Briefly spoke with son. Family would like transportation arranged to take pt to TCU. SATHYA informed them the ride is private pay. If needing a portable O2 tank, the cost is $75.  P: SATHYA will follow.     Cherry Escalona, BOB  [id number]

## 2017-06-04 NOTE — PLAN OF CARE
Problem: Goal Outcome Summary  Goal: Goal Outcome Summary  Outcome: No Change  A/O x 3; forgetful, [place], lethargic.  Bradycardic(at times) otherwise VSS on 1 LPM NC.  Tele- Sinus Bradycardia.  O2 sat. 90-92 throughout the night.  LS-diminished x all lobes; infrequent dry cough.  +3 BLE.  Hidalgo-patent.  Multiple wounds, see chart.  Assist x 1, walker/gait belt.  Heparin infusing, no active signs of bleeding; recheck in AM.  Hidalgo patent.

## 2017-06-05 ENCOUNTER — APPOINTMENT (OUTPATIENT)
Dept: PHYSICAL THERAPY | Facility: CLINIC | Age: 82
DRG: 175 | End: 2017-06-05
Payer: MEDICARE

## 2017-06-05 ENCOUNTER — APPOINTMENT (OUTPATIENT)
Dept: GENERAL RADIOLOGY | Facility: CLINIC | Age: 82
DRG: 175 | End: 2017-06-05
Attending: HOSPITALIST
Payer: MEDICARE

## 2017-06-05 LAB
ANION GAP SERPL CALCULATED.3IONS-SCNC: 2 MMOL/L (ref 3–14)
BUN SERPL-MCNC: 22 MG/DL (ref 7–30)
CALCIUM SERPL-MCNC: 9.4 MG/DL (ref 8.5–10.1)
CHLORIDE SERPL-SCNC: 112 MMOL/L (ref 94–109)
CO2 SERPL-SCNC: 32 MMOL/L (ref 20–32)
CREAT SERPL-MCNC: 0.85 MG/DL (ref 0.52–1.04)
GFR SERPL CREATININE-BSD FRML MDRD: 62 ML/MIN/1.7M2
GLUCOSE SERPL-MCNC: 86 MG/DL (ref 70–99)
INR PPP: 2.95 (ref 0.86–1.14)
POTASSIUM SERPL-SCNC: 4.2 MMOL/L (ref 3.4–5.3)
SODIUM SERPL-SCNC: 144 MMOL/L (ref 133–144)
SODIUM SERPL-SCNC: 146 MMOL/L (ref 133–144)

## 2017-06-05 PROCEDURE — 99207 ZZC CDG-MDM COMPONENT: MEETS MODERATE - UP CODED: CPT | Performed by: HOSPITALIST

## 2017-06-05 PROCEDURE — 40000193 ZZH STATISTIC PT WARD VISIT: Performed by: PHYSICAL THERAPIST

## 2017-06-05 PROCEDURE — 84295 ASSAY OF SERUM SODIUM: CPT | Performed by: HOSPITALIST

## 2017-06-05 PROCEDURE — 85610 PROTHROMBIN TIME: CPT | Performed by: INTERNAL MEDICINE

## 2017-06-05 PROCEDURE — 25800025 ZZH RX 258: Performed by: HOSPITALIST

## 2017-06-05 PROCEDURE — 25000132 ZZH RX MED GY IP 250 OP 250 PS 637: Mod: GY | Performed by: INTERNAL MEDICINE

## 2017-06-05 PROCEDURE — 99233 SBSQ HOSP IP/OBS HIGH 50: CPT | Performed by: HOSPITALIST

## 2017-06-05 PROCEDURE — 97116 GAIT TRAINING THERAPY: CPT | Mod: GP | Performed by: PHYSICAL THERAPIST

## 2017-06-05 PROCEDURE — A9270 NON-COVERED ITEM OR SERVICE: HCPCS | Mod: GY | Performed by: INTERNAL MEDICINE

## 2017-06-05 PROCEDURE — 71010 XR CHEST 1 VW: CPT

## 2017-06-05 PROCEDURE — 25000128 H RX IP 250 OP 636: Performed by: INTERNAL MEDICINE

## 2017-06-05 PROCEDURE — 80048 BASIC METABOLIC PNL TOTAL CA: CPT | Performed by: INTERNAL MEDICINE

## 2017-06-05 PROCEDURE — S5010 5% DEXTROSE AND 0.45% SALINE: HCPCS | Performed by: HOSPITALIST

## 2017-06-05 PROCEDURE — 25000125 ZZHC RX 250: Performed by: INTERNAL MEDICINE

## 2017-06-05 PROCEDURE — 36415 COLL VENOUS BLD VENIPUNCTURE: CPT | Performed by: HOSPITALIST

## 2017-06-05 PROCEDURE — 12000000 ZZH R&B MED SURG/OB

## 2017-06-05 PROCEDURE — 36415 COLL VENOUS BLD VENIPUNCTURE: CPT | Performed by: INTERNAL MEDICINE

## 2017-06-05 RX ADMIN — ACETAMINOPHEN 1000 MG: 500 TABLET, FILM COATED ORAL at 21:01

## 2017-06-05 RX ADMIN — ONDANSETRON 4 MG: 4 TABLET, ORALLY DISINTEGRATING ORAL at 21:19

## 2017-06-05 RX ADMIN — OMEPRAZOLE 20 MG: 20 CAPSULE, DELAYED RELEASE ORAL at 09:35

## 2017-06-05 RX ADMIN — ENOXAPARIN SODIUM 50 MG: 60 INJECTION SUBCUTANEOUS at 20:08

## 2017-06-05 RX ADMIN — ACETAMINOPHEN 1000 MG: 500 TABLET, FILM COATED ORAL at 09:35

## 2017-06-05 RX ADMIN — LEVOTHYROXINE SODIUM 88 MCG: 88 TABLET ORAL at 09:35

## 2017-06-05 RX ADMIN — ENOXAPARIN SODIUM 50 MG: 60 INJECTION SUBCUTANEOUS at 06:00

## 2017-06-05 RX ADMIN — DOCUSATE SODIUM 300 MG: 100 CAPSULE, LIQUID FILLED ORAL at 20:08

## 2017-06-05 RX ADMIN — POLYETHYLENE GLYCOL 3350 17 G: 17 POWDER, FOR SOLUTION ORAL at 09:35

## 2017-06-05 RX ADMIN — METOPROLOL SUCCINATE 50 MG: 50 TABLET, EXTENDED RELEASE ORAL at 09:35

## 2017-06-05 RX ADMIN — PSYLLIUM HUSK 1 PACKET: 3.4 POWDER ORAL at 09:35

## 2017-06-05 RX ADMIN — AMLODIPINE BESYLATE 2.5 MG: 2.5 TABLET ORAL at 09:35

## 2017-06-05 RX ADMIN — DEXTROSE AND SODIUM CHLORIDE: 5; 450 INJECTION, SOLUTION INTRAVENOUS at 09:35

## 2017-06-05 RX ADMIN — ATORVASTATIN CALCIUM 10 MG: 10 TABLET, FILM COATED ORAL at 21:01

## 2017-06-05 NOTE — PLAN OF CARE
Problem: Goal Outcome Summary  Goal: Goal Outcome Summary  Outcome: Improving  Patient alert/orient X3, forgetful.  Lungs diminished, on RA she desats to 84%, with oxygen 1 liter 95%.  Does get COOPER.  Ambulated with PT with sba/walker/belt.  Up in chair for meals, good appetite.  Lower extremity edema +2 bilaterally.  Pallative care meeting tomorrow with possible discharge to TCU.  Vss, denied pain.

## 2017-06-05 NOTE — PLAN OF CARE
Problem: Goal Outcome Summary  Goal: Goal Outcome Summary  Discharge Planner PT   Patient plan for discharge: Home.      Current status: Patient sitting up in chair upon arrival of therapist, agreeable to participate in therapy. Patient performed sit <> stand with FWW and Carly, cues provided for hand placement. Patient ambulated 60' with FWW and CGA, noted O2 sats on 2 L/min after ambulation, 93%.      Barriers to return to prior living situation: Lives alone, Fall Risk, Requiring assist for limited functional mobility     Recommendations for discharge: TCU     Rationale for recommendations: To improve activity tolerance and safety with functional mobility prior to returning home alone.        Entered by: Loli Haile 06/05/2017 12:10 PM

## 2017-06-05 NOTE — PROGRESS NOTES
SW  D) Patient is not ready for D/C today but may be ready in another day. A  TCU stay was planned at [facility]. A Palliative Care consult is now ordered.  I) Updated Rodríguez in Admissions at [facility]. He is holding a TCU bed for patient.   P) Will keep Rodríguez updated on the discharge plan.

## 2017-06-05 NOTE — PROGRESS NOTES
[facility]  Hospitalist Progress Note        Eliseo Douglass, DO  06/05/2017        Interval History:      Patient states that she is doing well. Discussed plan, patient verbalized understanding.          Assessment and Plan:        Christy Rueda is a 89 year-old female with a past medical history of hypertension, hyperlipidemia, transient ischemic attack, history of left-sided breast cancer status post mastectomy, chronic Hidalgo catheter due to urinary retention and hypothyroidism who presents with shortness of breath and right-sided chest pain admitted on 6/1/2017 after being diagnosed with pulmonary embolism.     Acute right-sided pulmonary emboli  Presented with right-sided chest pain and shortness of breath. Generally has a sedentary lifestyle with further decrease in activity recently. Also with history of breast cancer s/p mastectomy and chemotherapy ~15-20 years ago, now with innumerable pulmonary nodules as noted below, can not rule out metastatic disease, may contribute to propensity for clotting. Risks/benefits conversation regarding anticoagulation held during admission. Family agreeable to anticoagulation. LE US negative for DVT. Discussed with family echocardiogram as part of typical PE work-up. As LE US negative for DVT would be unlikely to pursue IVC filter placement with any RV dysfunction, especially given her age and questionable utility of this intervention when anticoagulation is not contraindicated. Family agreeable to forgo. Note troponin negative, NtpBNP within referance range.   - Enoxaparin for 1-2 days while INR therapeutic.    - Warfarin, pharm to dose.    - Plan for indefinite anticoagulation, however can be reassessed if she has high risks events such as falls at home that make risk of bleeding unacceptably high, hopefully after at least a 3 month course.      Innumerable bilateral pulmonary nodules  Indeterminate in nature on CT. Can not exclude metastatic  disease, does have history of breast cancer.   - Per report, discussed with family. At this point would not pursue any systemic treatment if diagnosed with metastatic disease, agreeable to forgo any additional testing or serial imaging as will not .     Pulmonary edema  Noted to require oxygen overnight, and more consistent oxygen needs 6/4/2017. CXR with diffuse bilateral pulmonary opacities suggestive of edema. May be iatrogenic in nature secondary to previous IV fluids, no other clear provocative factors. Last echocardiogram in 2013 with EF 65-70% with moderate diastolic dysfunction. Have deferred repeat echocardiogram for PE as discussed above.   - Give furosemide IV 20 mg x1 on 6/4 per report.    - Wean oxygen as able     Acute kidney injury, suspect pre-renal    Creatinine is mildly elevated at 1.29 on admission from baseline around 1.1. BUN elevated as well, >20:1.    - Creatinine improved      Nausea  Noted after lunch 6/3/2017. Per family, she has eaten significantly more 6/3/17 than is her usual. Having BM. Non-tender on exam.   - Resolved 6/4/2017     Abdominal tenderness  Noted on admission, none present at this time. LFTs unremarkable. Unclear etiology, but appears to have resolved.   - Monitor.      Leukocytosis, Pyuria  LFTs unremarkable. UA mildly abnormal, difficult to interpret in setting of chronic catheterization. UC with 10-50,000 E coli.    - Without suprapubic tenderness or other systemic signs of infection such as fever.    - Monitor.     Hyperlipidemia: Stable.    - Continue prior to admission statin.      Hypertension Blood pressure adequately controlled.    - Continue prior to admission metoprolol, amlodipine.      History of TIA: Aspirin has been discontinued given initiation of formal anticoagulation    - Monitor.     Chronic pain: Stable.    - Continue prior to admission acetaminophen twice daily and as needed.      Chronic urinary retention with indwelling Hidalgo  "catheter  UA mildly abnormal as discussed further above.    - Continue Hidalgo catheter     Advanced care planning Per report, family with questions 2017 about if she should be brought back to hospital if respiratory issue recurs at TCU, versus made comfortable at facility. Introduced role of hospice to family, including likely ability for hospice services to be delivered in patient's current home setting if this is what they choose. Discussed that with present plan for discharge to TCU and rehabilitation, would not be hospice appropriate, but encouraged them to discuss this at any time at TCU to arrange a formal meeting with hospice team if they elect to focus purely on comfort in the future.     DVT Prophylaxis: enoxaparin + warfarin.    Code: DNR/DNI     Disposition: Anticipating discharge to TCU, likely 1-2 days due to IV diuresis and hypernatremia.                    Physical Exam:      Heart Rate: 71    Blood pressure 133/63, pulse 71, temperature 98.5  F (36.9  C), temperature source Axillary, resp. rate 18, height 1.473 m (4' 10\"), weight 52.7 kg (116 lb 2.9 oz), last menstrual period 1950, SpO2 92 %.    Vitals:    17 0500 17 0548 17 0500   Weight: 50.2 kg (110 lb 10.7 oz) 51.5 kg (113 lb 8.6 oz) 52.7 kg (116 lb 2.9 oz)       Vital Sign Ranges  Temperature Temp  Av.3  F (36.8  C)  Min: 97.9  F (36.6  C)  Max: 98.5  F (36.9  C)   Blood pressure Systolic (24hrs), Av , Min:125 , Max:133        Diastolic (24hrs), Av, Min:57, Max:63      Pulse Pulse  Av  Min: 71  Max: 71   Respirations Resp  Av.7  Min: 16  Max: 20   Pulse oximetry SpO2  Av.6 %  Min: 77 %  Max: 96 %     Vital Signs with Ranges  Temp:  [97.9  F (36.6  C)-98.5  F (36.9  C)] 98.5  F (36.9  C)  Pulse:  [71] 71  Heart Rate:  [61-77] 71  Resp:  [16-20] 18  BP: (125-133)/(57-63) 133/63  SpO2:  [77 %-96 %] 92 %    I/O Last 3 Shifts:   I/O last 3 completed shifts:  In: 490 [P.O.:490]  Out: 1125 " [Urine:1125]    I/O past 24 hours:     Intake/Output Summary (Last 24 hours) at 06/05/17 0720  Last data filed at 06/05/17 0600   Gross per 24 hour   Intake              490 ml   Output             1125 ml   Net             -635 ml     GENERAL: Alert and oriented. NAD. Conversational, appropriate. Hard of hearing.   HEENT: Normocephalic. EOMI. No icterus or injection. Nares normal.   LUNGS: Decrement bilaterally. No dyspnea at rest.   HEART: Regular rate. Extremities perfused.   ABDOMEN: Soft, nontender, and nondistended. Positive bowel sounds.   EXTREMITIES: LE edema noted.   NEUROLOGIC: Moves extremities x4. No acute focal neurologic abnormalities noted.          Prior to Admission Medications:        Prescriptions Prior to Admission   Medication Sig Dispense Refill Last Dose     multivitamin, therapeutic with minerals (MULTI-VITAMIN) TABS tablet Take 1 tablet by mouth daily   5/31/2017 at Unknown time     Acetaminophen (TYLENOL PO) Take 500 mg by mouth daily as needed for mild pain or fever At MN if needed   prn med     DOCUSATE SODIUM PO Take 100-400 mg by mouth as needed for constipation   prn med     polyethylene glycol (MIRALAX/GLYCOLAX) packet Take 1 packet by mouth Every Mon, Wed, Fri Morning    5/31/2017 at Unknown time     CIPROFLOXACIN PO Take 250 mg by mouth daily as needed   prn med     BISACODYL RE Place 1 suppository rectally daily as needed   prn  med     TRAMADOL HCL PO Take 50 mg by mouth every 6 hours as needed for moderate to severe pain   prn med     sodium phosphate (FLEET ENEMA) 7-19 GM/118ML rectal enema Place 1 enema rectally daily as needed for constipation   prn med     psyllium (METAMUCIL/KONSYL) packet Take 1 packet by mouth daily    5/31/2017 at Unknown time     Acetaminophen (TYLENOL PO) Take 1,000 mg by mouth 2 times daily    5/31/2017 at pm     AmLODIPine Besylate (NORVASC PO) Take 2.5 mg by mouth daily    5/31/2017 at Unknown time     VITAMIN D, CHOLECALCIFEROL, PO Take 1,000 Units  by mouth daily   5/31/2017 at Unknown time     HYDROcodone-acetaminophen (VICODIN) 5-500 MG per tablet Take 1-2 tablets by mouth every 6 hours as needed for moderate to severe pain   prn med     aspirin 81 MG EC tablet Take 1 tablet by mouth daily. 90 tablet 3 5/31/2017 at Unknown time     OMEPRAZOLE PO Take 20 mg by mouth daily.   5/31/2017 at Unknown time     ATORVASTATIN CALCIUM PO Take 10 mg by mouth At Bedtime.   5/31/2017 at Unknown time     LOSARTAN POTASSIUM PO Take 100 mg by mouth daily    5/31/2017 at Unknown time     METOPROLOL SUCCINATE PO Take 50 mg by mouth daily    5/31/2017 at Unknown time     Levothyroxine Sodium (LEVOXYL PO) Take 88 mcg by mouth daily.   5/31/2017 at Unknown time     DOCUSATE SODIUM PO Take 300 mg by mouth every evening    5/31/2017 at Unknown time            Medications:        Current Facility-Administered Medications   Medication Last Rate     - MEDICATION INSTRUCTIONS -       Warfarin Therapy Reminder       Current Facility-Administered Medications   Medication Dose Route Frequency     enoxaparin  1 mg/kg Subcutaneous Q12H     sodium chloride (PF)  3 mL Intracatheter Q8H     acetaminophen (TYLENOL) tablet 1,000 mg  1,000 mg Oral BID     amLODIPine (NORVASC) tablet 2.5 mg  2.5 mg Oral Daily     atorvastatin (LIPITOR) tablet 10 mg  10 mg Oral At Bedtime     docusate sodium (COLACE) capsule 300 mg  300 mg Oral QPM     levothyroxine (SYNTHROID/LEVOTHROID) tablet 88 mcg  88 mcg Oral Daily     metoprolol (TOPROL-XL) 24 hr tablet 50 mg  50 mg Oral Daily     omeprazole (priLOSEC) CR capsule 20 mg  20 mg Oral Daily     polyethylene glycol  17 g Oral Q Mon Wed Fri AM     psyllium  1 packet Oral Daily     Current Facility-Administered Medications   Medication Dose Route Frequency     lidocaine  1 mL Other Q1H PRN     lidocaine 4%   Topical Q1H PRN     sodium chloride (PF)  3 mL Intracatheter Q1H PRN     naloxone  0.1-0.4 mg Intravenous Q2 Min PRN     - MEDICATION INSTRUCTIONS -   Does not  apply Continuous PRN     acetaminophen  325 mg Oral Q8H PRN     bisacodyl  10 mg Rectal Daily PRN     ondansetron  4 mg Oral Q6H PRN    Or     ondansetron  4 mg Intravenous Q6H PRN     prochlorperazine  5 mg Intravenous Q6H PRN    Or     prochlorperazine  5 mg Oral Q6H PRN    Or     prochlorperazine  12.5 mg Rectal Q12H PRN     Warfarin Therapy Reminder  1 each Does not apply Continuous PRN            Data:      Lab data reviewed.     Recent Labs  Lab 06/04/17  0950 06/04/17  0757 06/03/17  0814 06/02/17  0544 06/01/17  1059   HGB  --   --  10.7* 10.5* 11.4*   MCV  --   --  102* 102* 101*   PLT  --   --  238 207 246   INR  --  1.88* 1.50* 1.30* 1.05   *  --  145* 143 142   POTASSIUM  --   --  4.2 4.1 4.1   CHLORIDE  --   --  112* 113* 107   CO2  --   --  25 24 27   BUN  --   --  23 32* 42*   CR  --   --  0.74 0.85 1.29*   ANIONGAP  --   --  8 6 8   NAJMA  --   --  9.2 8.7 9.7   GLC  --   --  86 92 117*   TROPI  --   --   --   --  <0.015The 99th percentile for upper reference range is 0.045 ug/L.  Troponin values in the range of 0.045 - 0.120 ug/L may be associated with risks of adverse clinical events.           Imaging:      Imaging data reviewed.     Dr. Eliseo Douglass D.O.  [facility] Center[facility]  Pager [phone]

## 2017-06-05 NOTE — PLAN OF CARE
Problem: Goal Outcome Summary  Goal: Goal Outcome Summary  Outcome: No Change  Pt is A&Ox2, up with 1-2 assist walker gait belt. VSS on 1L NC. Small appetite. LS diminished. Lasix given this afternoon, good output. Hep drip stopped, Lovenox started. Plan to d/c tomorrow back to [facility].

## 2017-06-05 NOTE — PLAN OF CARE
Problem: Goal Outcome Summary  Goal: Goal Outcome Summary  Outcome: No Change  A/O x 2-disoriented to time and situation; forgetful, [place], lethargic.  VSS on 1 LPM NC.  Tele-Sinus Dysrhythmia with 1st degree AVB .  Recent INR 1.88. O2--desat. Twice while sleeping to 86-88 throughout the night (quickly resolves when encouraged to breath through nose) otherwise O2 sat. 90-92 throughout the night.  LS-diminished x all lobes; Anterior RLL and LLL-crackles; infrequent dry cough.  +1- +2 BLE.  Hidalgo-patent.  Multiple wounds, see chart.  Assist x 1, walker/gait belt.

## 2017-06-06 VITALS
HEIGHT: 58 IN | WEIGHT: 115.52 LBS | TEMPERATURE: 98.3 F | HEART RATE: 69 BPM | OXYGEN SATURATION: 93 % | RESPIRATION RATE: 18 BRPM | BODY MASS INDEX: 24.25 KG/M2 | SYSTOLIC BLOOD PRESSURE: 144 MMHG | DIASTOLIC BLOOD PRESSURE: 76 MMHG

## 2017-06-06 LAB
ANION GAP SERPL CALCULATED.3IONS-SCNC: 4 MMOL/L (ref 3–14)
BUN SERPL-MCNC: 16 MG/DL (ref 7–30)
CALCIUM SERPL-MCNC: 9.5 MG/DL (ref 8.5–10.1)
CHLORIDE SERPL-SCNC: 109 MMOL/L (ref 94–109)
CO2 SERPL-SCNC: 31 MMOL/L (ref 20–32)
CREAT SERPL-MCNC: 0.7 MG/DL (ref 0.52–1.04)
ERYTHROCYTE [DISTWIDTH] IN BLOOD BY AUTOMATED COUNT: 14.4 % (ref 10–15)
GFR SERPL CREATININE-BSD FRML MDRD: 76 ML/MIN/1.7M2
GLUCOSE SERPL-MCNC: 85 MG/DL (ref 70–99)
HCT VFR BLD AUTO: 34.1 % (ref 35–47)
HGB BLD-MCNC: 10.6 G/DL (ref 11.7–15.7)
INR PPP: 2.82 (ref 0.86–1.14)
MCH RBC QN AUTO: 32.5 PG (ref 26.5–33)
MCHC RBC AUTO-ENTMCNC: 31.1 G/DL (ref 31.5–36.5)
MCV RBC AUTO: 105 FL (ref 78–100)
PLATELET # BLD AUTO: 235 10E9/L (ref 150–450)
POTASSIUM SERPL-SCNC: 4.5 MMOL/L (ref 3.4–5.3)
RBC # BLD AUTO: 3.26 10E12/L (ref 3.8–5.2)
SODIUM SERPL-SCNC: 144 MMOL/L (ref 133–144)
WBC # BLD AUTO: 4.3 10E9/L (ref 4–11)

## 2017-06-06 PROCEDURE — 36415 COLL VENOUS BLD VENIPUNCTURE: CPT | Performed by: INTERNAL MEDICINE

## 2017-06-06 PROCEDURE — 99239 HOSP IP/OBS DSCHRG MGMT >30: CPT | Performed by: HOSPITALIST

## 2017-06-06 PROCEDURE — 25000132 ZZH RX MED GY IP 250 OP 250 PS 637: Mod: GY | Performed by: HOSPITALIST

## 2017-06-06 PROCEDURE — A9270 NON-COVERED ITEM OR SERVICE: HCPCS | Mod: GY | Performed by: HOSPITALIST

## 2017-06-06 PROCEDURE — 80048 BASIC METABOLIC PNL TOTAL CA: CPT | Performed by: INTERNAL MEDICINE

## 2017-06-06 PROCEDURE — 85610 PROTHROMBIN TIME: CPT | Performed by: INTERNAL MEDICINE

## 2017-06-06 PROCEDURE — 85027 COMPLETE CBC AUTOMATED: CPT | Performed by: INTERNAL MEDICINE

## 2017-06-06 PROCEDURE — 99207 ZZC CDG-CORRECTLY CODED, REVIEWED AND AGREE: CPT | Performed by: NURSE PRACTITIONER

## 2017-06-06 PROCEDURE — 99223 1ST HOSP IP/OBS HIGH 75: CPT | Performed by: NURSE PRACTITIONER

## 2017-06-06 PROCEDURE — 25000128 H RX IP 250 OP 636: Performed by: INTERNAL MEDICINE

## 2017-06-06 PROCEDURE — A9270 NON-COVERED ITEM OR SERVICE: HCPCS | Mod: GY | Performed by: INTERNAL MEDICINE

## 2017-06-06 PROCEDURE — 25000132 ZZH RX MED GY IP 250 OP 250 PS 637: Mod: GY | Performed by: INTERNAL MEDICINE

## 2017-06-06 RX ORDER — TRAMADOL HYDROCHLORIDE 50 MG/1
50 TABLET ORAL EVERY 6 HOURS PRN
Qty: 28 TABLET | Refills: 0 | Status: SHIPPED | OUTPATIENT
Start: 2017-06-06

## 2017-06-06 RX ORDER — WARFARIN SODIUM 1 MG/1
1 TABLET ORAL
Status: COMPLETED | OUTPATIENT
Start: 2017-06-06 | End: 2017-06-06

## 2017-06-06 RX ORDER — FUROSEMIDE 10 MG/ML
20 INJECTION INTRAMUSCULAR; INTRAVENOUS ONCE
Status: DISCONTINUED | OUTPATIENT
Start: 2017-06-06 | End: 2017-06-06

## 2017-06-06 RX ORDER — FUROSEMIDE 40 MG
40 TABLET ORAL ONCE
Status: COMPLETED | OUTPATIENT
Start: 2017-06-06 | End: 2017-06-06

## 2017-06-06 RX ORDER — WARFARIN SODIUM 1 MG/1
1 TABLET ORAL DAILY
Qty: 30 TABLET | Refills: 0 | Status: SHIPPED | OUTPATIENT
Start: 2017-06-06

## 2017-06-06 RX ADMIN — FUROSEMIDE 40 MG: 40 TABLET ORAL at 12:52

## 2017-06-06 RX ADMIN — PSYLLIUM HUSK 1 PACKET: 3.4 POWDER ORAL at 09:07

## 2017-06-06 RX ADMIN — OMEPRAZOLE 20 MG: 20 CAPSULE, DELAYED RELEASE ORAL at 09:07

## 2017-06-06 RX ADMIN — ACETAMINOPHEN 1000 MG: 500 TABLET, FILM COATED ORAL at 09:07

## 2017-06-06 RX ADMIN — LEVOTHYROXINE SODIUM 88 MCG: 88 TABLET ORAL at 09:06

## 2017-06-06 RX ADMIN — WARFARIN SODIUM 1 MG: 1 TABLET ORAL at 17:26

## 2017-06-06 RX ADMIN — METOPROLOL SUCCINATE 50 MG: 50 TABLET, EXTENDED RELEASE ORAL at 09:06

## 2017-06-06 RX ADMIN — ENOXAPARIN SODIUM 50 MG: 60 INJECTION SUBCUTANEOUS at 06:38

## 2017-06-06 RX ADMIN — AMLODIPINE BESYLATE 2.5 MG: 2.5 TABLET ORAL at 09:07

## 2017-06-06 NOTE — DISCHARGE INSTRUCTIONS
Chronic franks catheter cares per unit routine      Discharge to [facility] TCU, phone [phone]

## 2017-06-06 NOTE — PROGRESS NOTES
SATHYA URENA) MD has completed D/C orders for TCU placement. Patient was accepted at [facility]'s TCU as she is from their housing.  I) Spoke with son, Oneil who requests transport. Noted oxygen is needed and patient will be billed for both. Arranged [facility] portable tank delivery and faxed orders and face sheet. Set up [facility] wheelchair ride at 1730. Garrett requests a private room if available. Left Rodríguez at [facility] a voice mail message requesting this. Faxed orders and scripts. Spoke with NP with Palliative Care who states family is interested in an informational hospice meeting, if this can be arranged prior to D/C. She notes family and patient agree to a TCU stay now, but want hospice information for the future. Spoke with Syeda with [facility] and she will see family around 1630 in patient's room.  A) Family and patient agree to this plan.  P) D/C to TCU at [facility].

## 2017-06-06 NOTE — PLAN OF CARE
Problem: Goal Outcome Summary  Goal: Goal Outcome Summary  Outcome: No Change  A&O, Assist x1. SOB with activity. On 1L of O2. Desats to 88% when weaned to 0.5L. Pt refused to use IS.  Poor appetite. Possible DC to TCU tomorrow.

## 2017-06-06 NOTE — PROGRESS NOTES
Hospice: Was asked to met with family to explain hospice services. Met with pts son Efrain and his wife in conference room on station [id number]. Pt will be discharging to TCU at [facility] and family wanting hospice information so they can make the best decision after the TCU ends. I explained the hospice philosophy of care, the medicare benefit, services available for support, medication and equipment coverage. The family is not sure whether the pt will stay in the care center or go back to her AL apt when she is done with TCU. I explained how the hospice staff would collaborate with the facility staff and if she were to go back to her own apt how we would collaborate with the 24/7 staff they hire for her. I left them with the hospice information sheet with my phone umber for questions and the hospice care navigator number for when they are ready to sign into the hospice program. Thank you for the referral.Syeda Londono RN, BSN   Hospice Admission nurse  [phone]

## 2017-06-06 NOTE — PLAN OF CARE
Problem: Goal Outcome Summary  Goal: Goal Outcome Summary  PT: Per Nursing, patient about to disch to TCU, and would be better served by deferring PT this PM, and saving her energy for the trip to TCU.     Physical Therapy Discharge Summary     Reason for therapy discharge:    Discharged to transitional care facility.     Progress towards therapy goal(s). See goals on Care Plan in [facility] electronic health record for goal details.  Goals not met.  Barriers to achieving goals:   limited tolerance for therapy.     Therapy recommendation(s):    Continued therapy is recommended.  Rationale/Recommendations:  Will continue to need skilled PT for recovery of greater functional independence, mobility, and safety to negotiate chosen residence..

## 2017-06-06 NOTE — CONSULTS
[facility]    Palliative Care Consultation     Christy Rueda  MRN# [id number]  Date of Admission:  2017  Date of Service (when I saw the patient): 17  Reason for consult: Consulted by Dr. Douglass for Goals of care    Assessment & Plan   Christy Rueda is a 89 year old female with PMH significant for diastolic dysfunction, HTN, HLD, TIA, left breast CA s/p mastectomy, thoracic and lumbar compression fractures, and chronic indwelling franks catheter who presents with SOB and chest wall pain. She was noted to have an acute right pulmonary embolism. She has been started on anticoagulation (lovenox as a bridge to coumadin). She was noted to have mild RAPHAEL and pulmonary edema. Her condition is stable. We are being consulted for goals of care.     Symptoms/Recommendations   -TCU is a reasonable discharge plan. Family intends to give it some time to see if pt can rehabilitate and get back into her assisted living apartment. If she is unsuccessful with rehabilitation, family wants to initiate hospice   -Hospice consultation (informational) prior to discharge or in the next few days at [facility]; unit [facility] to assist with setting this up   -We updated her POLST form which designates DNR/DNI, comfort measures with hope and plan to not rehospitalize if possible. When she decompensates in the future, family plans to initiate hospice     Support/Coping  -6 adult children, 1 is . Son Oneil is the most supportive and present today. His wife Fozia is also supportive. Only one other child lives local, but she is estranged from [facility]. Other adult children do not live local   -Pt is [employer]     Decisional Support, Goals of Care, Counseling & Coordination  Decisional Capacity Intact?  -No  Health Care Directive on File?  -POLST reviewed. A new one was updated today   Code Status/Resuscitation Preferences?  -DNR/DNI     Discussion  Visited with pt, son Oneil, and DIL Fozia this  afternoon. Pt is sitting up in the chair. She is not comfortable in the chair and wants to get back in bed. Her back is quite contracted from hx compression fractures. This makes it hard for her breathe comfortably. Family feels she is quite sleepy and withdrawn today, which is new for her. Family thus asked to meet in private.    Son Oneil, KURT Marcelo and I thus met in a private space. I introduced the scope of our practice to Lakshmi. Discussed our potential roles for symptom management, support/coping, and decisional support (aka goals of care). They wonder what the difference is between palliative care and hospice. I share with them that palliative care can follow along one's journey with chronic illness, whether goals are to restore health or not. Hospice is generally reserved for those not seeking life prolonging treatments for their disease, and generally for end of life.     They have many questions about plan of care options, TCU vs hospice, and medicare payment.     Overall, they have a good understanding of Susanna's current condition, which is that she presented with a new blood clot, which is being treated with blood thinners. They also understand she has new lung nodules, which may represent cancer. It is not important for them to find out if that is the case.     Overall, Cans health has been declining in the last 6 months. She had a hospitalization in 2/2017 for unresponsiveness, thought to be 2/2 dehydration. She has been weaker in recent months. It is becoming more challenging for her to walk to the dining mays. She sleeps a lot. She also has been falling. It has always been important to her, and them, and very comforting for her to be in her own apartment.     We talk about the need for anticoagulation and the risk/benefits in setting of falls/weakness. Without anticoagulation, I worry that her PE would cause worsening symptoms (SOB, respiratory failure) and could be quite fatal (possibly within  "days to weeks). With anticoagulation, we certainly do worry about her weakness and risk for falling.    Family cannot recall talking to Susanna about death and dying. Nevertheless, they understand that purely based on her age, time to live is likely short, nobody knows exactly. Adding on her medical issues certainly complicates this. They do believe her quality of life continues to be reasonable. They are very loving and supportive to her. She has not been talking about being unhappy or wanting to die. We do talk about the importance of exploring this further with Susanna.     We talk about options moving forward. Oneil is very sure that trying TCU is the best option at this time. He hopes this may restore some strength and possibly give Susanna the opportunity to return to her apartment. Without trying TCU, he knows she would not be strong enough to get back to her apartment. And he does not want to not try.     It is possible that Susanna does not want to rehabilitate, or that her body may not be capable of getting stronger from here. Oneil is worried about her present state today, he worries that this may be the beginning of the end. I do agree that time will tell.     Hospice would be a very reasonable option in the near future, if things at TCU do not go well. I educated family regarding hospice philosophy and prognostic criteria. Dispelled common myths. Discussed what hospice is (and is not), what services are usually provided (and those that are not, ex \"senior living care\"), under what circumstances people tend to enroll, and the variety of places people can get hospice care (along with subsequent financial implications).     Family ultimately decides that trying TCU makes the most sense. Pending her ability to rehab, or should she not reach her goals, they could enroll in hospice at any point. I believe she is eligible for hospice.     I recommend an informational hospice consultation (no need to sign up now) as soon as today " before discharge, or within the next few days. Therefore, when they are ready for hospice, they simply need to call a phone # to set it up. Family agrees with this plan.     Family do not feel that rehospitalizing Susanna makes sense moving forward. The next time she gets sick, they want to keep her at [facility] and enroll in hospice (if she is not already enrolled). I thus recommend that a POLST form be updated, which they are agreeable to.     I did revisit with Susanna again to assess her understanding of her health and discuss her hopes and fears. However, she is too lethargic to participate. I did share with her that we will attempt rehabilitation to see if that allows her to get back into her apartment. She is largely agreeable to this. I did share with her that if she reaches a point where she cannot participate in activity, or if she does not want to, that it is ok to let staff know and to stop. I share with her that there are ways to ensure she is comfortable. She dozes off during this discussion and is unable to talk further about her hopes and concerns.     Family and I thus work on a POLST. We designate DNR/DNI, comfort care with plan to not rehospitalize or come to the ED, if at all possible. They want to offer food and liquid by mouth, but not consider a feeding tube. They want to offer oral abx and IVF, if it will provide comfort and help allow for a bit more time to notify family to come say goodbye.     Case and plan of care reviewed with unit SATHYA Gayle. Pt will discharge to [facility] TCU today.     Thank you for involving us in the care of this patient and family. We will sign off at this time. Please do not hesitate to contact me with questions or concerns or the on-call provider for our team if evening or weekend.    Margot FREED, [facility]  Palliative Medicine   Pager [phone]    Attestation:  Total time on the floor involved in the patient's care: 120 minutes (only 30 minutes spent in  pt's room)  Total time spent in counseling/care coordination: >50%    Chief Complaint   SOB, chest wall pain     History is obtained from the patient, staff, family and extensive chart review.     Past Medical History    I have reviewed this patient's medical history and updated it with pertinent information if needed.   Past Medical History:   Diagnosis Date     Cancer (H)     breast     Congestive heart failure (H)      High cholesterol      Hypertension      LFT elevation        Past Surgical History   I have reviewed this patient's surgical history and updated it with pertinent information if needed.  Past Surgical History:   Procedure Laterality Date     BACK SURGERY       BREAST SURGERY      lumpectomy      GYN SURGERY      hysterectomy, c- section      HEAD & NECK SURGERY      thyroidectomy      ORTHOPEDIC SURGERY      TKA        Social History   Living situation: [facility]    Family system: 6 adult children, son Oneil and DIL Fozia most supportive. 1 other child lives local but she is estranged. Other adult children are not local     Self-identified support system: Lakshmi, friends at [facility]    Employment/education: [place]    Activities/interests: [place]    Use of community resources: None     Tenriism affiliation: Yazidism     Involvement in elsy community: [place]    Impact of illness on patient: Pt is nearing the end of her life (89) and has chronic medical issues, time to live is likely shortened by both of these factors     Family History   I have reviewed this patient's family history and updated it with pertinent information if needed.   No family history on file.    Allergies   Allergies   Allergen Reactions     Demerol Nausea and Vomiting     Levaquin      Morphine      Thinks nausea and vomiting with morphine but is not positive     Penicillins Nausea and Vomiting       Medications   Current Facility-Administered Medications Ordered in Epic   Medication Dose Route Frequency Last Rate Last Dose      warfarin (COUMADIN) tablet 1 mg  1 mg Oral ONCE at 18:00         lidocaine 1 % 1 mL  1 mL Other Q1H PRN         lidocaine (LMX4) cream   Topical Q1H PRN         sodium chloride (PF) 0.9% PF flush 3 mL  3 mL Intracatheter Q1H PRN   3 mL at 06/04/17 1442     sodium chloride (PF) 0.9% PF flush 3 mL  3 mL Intracatheter Q8H   3 mL at 06/05/17 0600     acetaminophen (TYLENOL) tablet 1,000 mg  1,000 mg Oral BID   1,000 mg at 06/06/17 0907     amLODIPine (NORVASC) tablet 2.5 mg  2.5 mg Oral Daily   2.5 mg at 06/06/17 0907     atorvastatin (LIPITOR) tablet 10 mg  10 mg Oral At Bedtime   10 mg at 06/05/17 2101     docusate sodium (COLACE) capsule 300 mg  300 mg Oral QPM   300 mg at 06/05/17 2008     levothyroxine (SYNTHROID/LEVOTHROID) tablet 88 mcg  88 mcg Oral Daily   88 mcg at 06/06/17 0906     metoprolol (TOPROL-XL) 24 hr tablet 50 mg  50 mg Oral Daily   50 mg at 06/06/17 0906     omeprazole (priLOSEC) CR capsule 20 mg  20 mg Oral Daily   20 mg at 06/06/17 0907     polyethylene glycol (MIRALAX/GLYCOLAX) Packet 17 g  17 g Oral Q Mon Wed Fri AM   17 g at 06/05/17 0935     psyllium (METAMUCIL/KONSYL) Packet 1 packet  1 packet Oral Daily   1 packet at 06/06/17 0907     naloxone (NARCAN) injection 0.1-0.4 mg  0.1-0.4 mg Intravenous Q2 Min PRN         Patient is already receiving anticoagulation with heparin, enoxaparin (LOVENOX), warfarin (COUMADIN)  or other anticoagulant medication   Does not apply Continuous PRN         acetaminophen (TYLENOL) tablet 325 mg  325 mg Oral Q8H PRN         bisacodyl (DULCOLAX) Suppository 10 mg  10 mg Rectal Daily PRN         ondansetron (ZOFRAN-ODT) ODT tab 4 mg  4 mg Oral Q6H PRN   4 mg at 06/05/17 2119    Or     ondansetron (ZOFRAN) injection 4 mg  4 mg Intravenous Q6H PRN         prochlorperazine (COMPAZINE) injection 5 mg  5 mg Intravenous Q6H PRN        Or     prochlorperazine (COMPAZINE) tablet 5 mg  5 mg Oral Q6H PRN        Or     prochlorperazine (COMPAZINE) Suppository 12.5 mg   12.5 mg Rectal Q12H PRN         Warfarin Therapy Reminder (Check START DATE - warfarin may be starting in the FUTURE)  1 each Does not apply Continuous PRN         Current Outpatient Prescriptions Ordered in [facility]   Medication     traMADol (ULTRAM) 50 MG tablet     warfarin (COUMADIN) 1 MG tablet       Review of Systems   The comprehensive review of systems is negative other than noted here and in the assessment/plan.    Palliative Symptom Review (0=no symptom/no concern, 1=mild, 2=moderate, 3=severe):  Pain: 0-1  Fatigue: 2  Nausea: 0  Constipation: 0  Diarrhea: 0  Drowsiness: 1  Poor Appetite: 0  Shortness of Breath: 1    Physical Exam   Temp: 99  F (37.2  C) Temp src: Axillary BP: 153/76 Pulse: 69 Heart Rate: 73 Resp: 18 SpO2: 95 % O2 Device: Nasal cannula Oxygen Delivery: 1.5 LPM  Vitals:    06/04/17 0548 06/05/17 0500 06/06/17 0643   Weight: 51.5 kg (113 lb 8.6 oz) 52.7 kg (116 lb 2.9 oz) 52.4 kg (115 lb 8.3 oz)     CONSTITUTIONAL: Chronically ill elderly woman seen sitting up in the chair in mild distress, she is a bit lethargic/somnolent, but does respond to verbal cue. She is [place]. She is calm. Family present.   HEENT: NCAT  RESPIRATORY: NL respiratory effort on 1L via NC  MUSCULOSKELETAL: Spine is hunched and contracted     Data   Results for orders placed or performed during the hospital encounter of 06/01/17 (from the past 24 hour(s))   XR Chest 1 View    Narrative    CHEST ONE VIEW UPRIGHT  6/5/2017 2:35 PM     HISTORY: Interval.    COMPARISON: 6/4/2017.      Impression    IMPRESSION: Stable interstitial edema and/or fibrosis since the  comparison study. No new infiltrates.    MARVA LEVIN MD   INR   Result Value Ref Range    INR 2.82 (H) 0.86 - 1.14   Basic metabolic panel   Result Value Ref Range    Sodium 144 133 - 144 mmol/L    Potassium 4.5 3.4 - 5.3 mmol/L    Chloride 109 94 - 109 mmol/L    Carbon Dioxide 31 20 - 32 mmol/L    Anion Gap 4 3 - 14 mmol/L    Glucose 85 70 - 99 mg/dL    Urea Nitrogen 16  7 - 30 mg/dL    Creatinine 0.70 0.52 - 1.04 mg/dL    GFR Estimate 76 >60 mL/min/1.7m2    GFR Estimate If Black >90   GFR Calc   >60 mL/min/1.7m2    Calcium 9.5 8.5 - 10.1 mg/dL   CBC with platelets   Result Value Ref Range    WBC 4.3 4.0 - 11.0 10e9/L    RBC Count 3.26 (L) 3.8 - 5.2 10e12/L    Hemoglobin 10.6 (L) 11.7 - 15.7 g/dL    Hematocrit 34.1 (L) 35.0 - 47.0 %     (H) 78 - 100 fl    MCH 32.5 26.5 - 33.0 pg    MCHC 31.1 (L) 31.5 - 36.5 g/dL    RDW 14.4 10.0 - 15.0 %    Platelet Count 235 150 - 450 10e9/L

## 2017-06-06 NOTE — PLAN OF CARE
Problem: Goal Outcome Summary  Goal: Goal Outcome Summary  Outcome: No Change  Assumed care at 0700. Arouses to voice but sleeps mostly. VSS. Oriented X3. Forgetful. [place]. Unable to wean her off oxygen. Pt desats down into mid 80s on RA. Currently on 1-2 L oxygen to keeps sats above 92%. Up in chair for breakfast. Tolerated some bf not much appetite. Encouraged to use IS. Pt refuses. [place] intact. PCD on. Dc today at 1730 at [facility]. Family at the bedside. Very involved in pt care. Seen by palliative care. Hospice to meet pt today at 1630 prior to dc.

## 2017-06-06 NOTE — PROGRESS NOTES
[facility]  Hospitalist Progress Note        Eliseo Douglass,   06/06/2017        Interval History:      Patient resting comfortably today. Plan for informational meeting with palliative today at family request.          Assessment and Plan:        Christy Rueda is a 89 year-old female with a past medical history of hypertension, hyperlipidemia, transient ischemic attack, history of left-sided breast cancer status post mastectomy, chronic Hidalgo catheter due to urinary retention and hypothyroidism who presents with shortness of breath and right-sided chest pain admitted on 6/1/2017 after being diagnosed with pulmonary embolism.      Acute right-sided pulmonary emboli Presented with right-sided chest pain and shortness of breath. Generally has a sedentary lifestyle with further decrease in activity recently. Also with history of breast cancer s/p mastectomy and chemotherapy ~15-20 years ago, now with innumerable pulmonary nodules as noted below, can not rule out metastatic disease, may contribute to propensity for clotting. Risks/benefits conversation regarding anticoagulation held during admission. Family agreeable to anticoagulation. LE US negative for DVT. Discussed with family echocardiogram as part of typical PE work-up. As LE US negative for DVT would be unlikely to pursue IVC filter placement with any RV dysfunction, especially given her age and questionable utility of this intervention when anticoagulation is not contraindicated. Family agreeable to forgo. Note troponin negative, NtpBNP within referance range.  - Warfarin, pharm to dose.   - Plan for indefinite anticoagulation, however can be reassessed if she has high risks events such as falls at home that make risk of bleeding unacceptably high, hopefully after at least a 3 month course.       Innumerable bilateral pulmonary nodules Indeterminate in nature on CT. Can not exclude metastatic disease, does have history of breast  cancer.  - Per report, discussed with family. At this point would not pursue any systemic treatment if diagnosed with metastatic disease, agreeable to forgo any additional testing or serial imaging as will not .      Pulmonary edema Noted to require oxygen overnight, and more consistent oxygen needs 6/4/2017. CXR with diffuse bilateral pulmonary opacities suggestive of edema. May be iatrogenic in nature secondary to previous IV fluids, no other clear provocative factors. Last echocardiogram in 2013 with EF 65-70% with moderate diastolic dysfunction. Have deferred repeat echocardiogram for PE as discussed above.  - Wean oxygen as able      Acute kidney injury, suspect pre-renal Creatinine is mildly elevated at 1.29 on admission from baseline around 1.1. BUN elevated as well, >20:1.   - Creatinine improved       Nausea Noted after lunch 6/3/2017. Per family, she has eaten significantly more 6/3/17 than is her usual. Having BM. Non-tender on exam.  - Resolved 6/4/2017      Abdominal tenderness Noted on admission, none present at this time. LFTs unremarkable. Unclear etiology, but appears to have resolved.  - Monitor.       Leukocytosis, Pyuria LFTs unremarkable. UA mildly abnormal, difficult to interpret in setting of chronic catheterization. UC with 10-50,000 E coli.   - Without suprapubic tenderness or other systemic signs of infection such as fever.   - Monitor.      Hyperlipidemia: Stable.   - Continue prior to admission statin.       Hypertension Blood pressure adequately controlled.   - Continue prior to admission metoprolol, amlodipine.       History of TIA: Aspirin has been discontinued given initiation of formal anticoagulation   - Monitor.      Chronic pain: Stable.   - Continue prior to admission acetaminophen twice daily and as needed.       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   - Continue Hidalgo catheter      Advanced care  "planning Per report, family with questions 2017 about if she should be brought back to hospital if respiratory issue recurs at TCU, versus made comfortable at facility. Introduced role of hospice to family, including likely ability for hospice services to be delivered in patient's current home setting if this is what they choose. Discussed that with present plan for discharge to TCU and rehabilitation, would not be hospice appropriate, but encouraged them to discuss this at any time at TCU to arrange a formal meeting with hospice team if they elect to focus purely on comfort in the future.      DVT Prophylaxis: warfarin.     Code: DNR/DNI      Disposition: Anticipating discharge to TCU, likely today.                    Physical Exam:      Heart Rate: 73    Blood pressure 153/76, pulse 69, temperature 99  F (37.2  C), temperature source Axillary, resp. rate 18, height 1.473 m (4' 10\"), weight 52.4 kg (115 lb 8.3 oz), last menstrual period 1950, SpO2 95 %.    Vitals:    17 0548 17 0500 17 0643   Weight: 51.5 kg (113 lb 8.6 oz) 52.7 kg (116 lb 2.9 oz) 52.4 kg (115 lb 8.3 oz)       Vital Sign Ranges  Temperature Temp  Av.2  F (36.8  C)  Min: 97.6  F (36.4  C)  Max: 99  F (37.2  C)   Blood pressure Systolic (24hrs), Av , Min:136 , Max:153        Diastolic (24hrs), Av, Min:75, Max:76      Pulse Pulse  Av  Min: 69  Max: 69   Respirations Resp  Av.7  Min: 16  Max: 19   Pulse oximetry SpO2  Av.9 %  Min: 85 %  Max: 95 %     Vital Signs with Ranges  Temp:  [97.6  F (36.4  C)-99  F (37.2  C)] 99  F (37.2  C)  Pulse:  [69] 69  Heart Rate:  [73-77] 73  Resp:  [16-19] 18  BP: (136-153)/(75-76) 153/76  SpO2:  [85 %-95 %] 95 %    I/O Last 3 Shifts:   I/O last 3 completed shifts:  In: 160 [P.O.:160]  Out: 1000 [Urine:1000]    I/O past 24 hours:     Intake/Output Summary (Last 24 hours) at 17  Last data filed at 17 06   Gross per 24 hour   Intake              " 160 ml   Output             1000 ml   Net             -840 ml     GENERAL: Alert and oriented. NAD. Conversational, appropriate. Hard of hearing. Pleasant.   HEENT: Normocephalic. EOMI. No icterus or injection. Nares normal. NC in place.   LUNGS: Decrement bilaterally, improved. No dyspnea at rest.   HEART: Regular rate. Extremities perfused.   ABDOMEN: Soft, nontender, and nondistended. Positive bowel sounds.   EXTREMITIES: LE edema noted.   NEUROLOGIC: Moves extremities x4. No acute focal neurologic abnormalities noted.          Prior to Admission Medications:        Prescriptions Prior to Admission   Medication Sig Dispense Refill Last Dose     multivitamin, therapeutic with minerals (MULTI-VITAMIN) TABS tablet Take 1 tablet by mouth daily   5/31/2017 at Unknown time     Acetaminophen (TYLENOL PO) Take 500 mg by mouth daily as needed for mild pain or fever At MN if needed   prn med     DOCUSATE SODIUM PO Take 100-400 mg by mouth as needed for constipation   prn med     polyethylene glycol (MIRALAX/GLYCOLAX) packet Take 1 packet by mouth Every Mon, Wed, Fri Morning    5/31/2017 at Unknown time     CIPROFLOXACIN PO Take 250 mg by mouth daily as needed   prn med     BISACODYL RE Place 1 suppository rectally daily as needed   prn  med     TRAMADOL HCL PO Take 50 mg by mouth every 6 hours as needed for moderate to severe pain   prn med     sodium phosphate (FLEET ENEMA) 7-19 GM/118ML rectal enema Place 1 enema rectally daily as needed for constipation   prn med     psyllium (METAMUCIL/KONSYL) packet Take 1 packet by mouth daily    5/31/2017 at Unknown time     Acetaminophen (TYLENOL PO) Take 1,000 mg by mouth 2 times daily    5/31/2017 at pm     AmLODIPine Besylate (NORVASC PO) Take 2.5 mg by mouth daily    5/31/2017 at Unknown time     VITAMIN D, CHOLECALCIFEROL, PO Take 1,000 Units by mouth daily   5/31/2017 at Unknown time     HYDROcodone-acetaminophen (VICODIN) 5-500 MG per tablet Take 1-2 tablets by mouth every 6  hours as needed for moderate to severe pain   prn med     aspirin 81 MG EC tablet Take 1 tablet by mouth daily. 90 tablet 3 5/31/2017 at Unknown time     OMEPRAZOLE PO Take 20 mg by mouth daily.   5/31/2017 at Unknown time     ATORVASTATIN CALCIUM PO Take 10 mg by mouth At Bedtime.   5/31/2017 at Unknown time     LOSARTAN POTASSIUM PO Take 100 mg by mouth daily    5/31/2017 at Unknown time     METOPROLOL SUCCINATE PO Take 50 mg by mouth daily    5/31/2017 at Unknown time     Levothyroxine Sodium (LEVOXYL PO) Take 88 mcg by mouth daily.   5/31/2017 at Unknown time     DOCUSATE SODIUM PO Take 300 mg by mouth every evening    5/31/2017 at Unknown time            Medications:        Current Facility-Administered Medications   Medication Last Rate     - MEDICATION INSTRUCTIONS -       Warfarin Therapy Reminder       Current Facility-Administered Medications   Medication Dose Route Frequency     enoxaparin  1 mg/kg Subcutaneous Q12H     sodium chloride (PF)  3 mL Intracatheter Q8H     acetaminophen (TYLENOL) tablet 1,000 mg  1,000 mg Oral BID     amLODIPine (NORVASC) tablet 2.5 mg  2.5 mg Oral Daily     atorvastatin (LIPITOR) tablet 10 mg  10 mg Oral At Bedtime     docusate sodium (COLACE) capsule 300 mg  300 mg Oral QPM     levothyroxine (SYNTHROID/LEVOTHROID) tablet 88 mcg  88 mcg Oral Daily     metoprolol (TOPROL-XL) 24 hr tablet 50 mg  50 mg Oral Daily     omeprazole (priLOSEC) CR capsule 20 mg  20 mg Oral Daily     polyethylene glycol  17 g Oral Q Mon Wed Fri AM     psyllium  1 packet Oral Daily     Current Facility-Administered Medications   Medication Dose Route Frequency     lidocaine  1 mL Other Q1H PRN     lidocaine 4%   Topical Q1H PRN     sodium chloride (PF)  3 mL Intracatheter Q1H PRN     naloxone  0.1-0.4 mg Intravenous Q2 Min PRN     - MEDICATION INSTRUCTIONS -   Does not apply Continuous PRN     acetaminophen  325 mg Oral Q8H PRN     bisacodyl  10 mg Rectal Daily PRN     ondansetron  4 mg Oral Q6H PRN     Or     ondansetron  4 mg Intravenous Q6H PRN     prochlorperazine  5 mg Intravenous Q6H PRN    Or     prochlorperazine  5 mg Oral Q6H PRN    Or     prochlorperazine  12.5 mg Rectal Q12H PRN     Warfarin Therapy Reminder  1 each Does not apply Continuous PRN            Data:      Lab data reviewed.     Recent Labs  Lab 06/06/17  0815 06/05/17  1215 06/05/17  0809  06/04/17  0757 06/03/17  0814 06/02/17  0544 06/01/17  1059   HGB 10.6*  --   --   --   --  10.7* 10.5* 11.4*   *  --   --   --   --  102* 102* 101*     --   --   --   --  238 207 246   INR 2.82*  --  2.95*  --  1.88* 1.50* 1.30* 1.05    144 146*  < >  --  145* 143 142   POTASSIUM 4.5  --  4.2  --   --  4.2 4.1 4.1   CHLORIDE 109  --  112*  --   --  112* 113* 107   CO2 31  --  32  --   --  25 24 27   BUN 16  --  22  --   --  23 32* 42*   CR 0.70  --  0.85  --   --  0.74 0.85 1.29*   ANIONGAP 4  --  2*  --   --  8 6 8   NAJMA 9.5  --  9.4  --   --  9.2 8.7 9.7   GLC 85  --  86  --   --  86 92 117*   TROPI  --   --   --   --   --   --   --  <0.015The 99th percentile for upper reference range is 0.045 ug/L.  Troponin values in the range of 0.045 - 0.120 ug/L may be associated with risks of adverse clinical events.   < > = values in this interval not displayed.        Imaging:      Imaging data reviewed.     Dr. Eliseo Douglass D.O.  [facility]  Pager [phone]

## 2017-06-06 NOTE — DISCHARGE SUMMARY
[facility]    Discharge Summary  Hospitalist    Date of Admission:  6/1/2017  Date of Discharge:  6/6/2017  Discharging Provider: Eliseo Douglass  Date of Service (when I saw the patient): 06/06/17    Discharge Diagnoses   Other acute pulmonary embolism (H)    History of Present Illness   History is obtained from patient's son and daughter-in-law and also discussed with the ED physician.  The patient is minimally contributing to the history as she is dozing off in between.         Christy Rueda is a 89-year-old female with a past medical history of diastolic dysfunction, hyperlipidemia, history of TIA, left-sided breast cancer status post mastectomy who is presenting from her independent living facility due to right-sided chest pain and shortness of breath.  Her son reports that yesterday he was walking her to the dining area and she appeared somewhat short of breath and needed a break to get to the dining area which is not typical for her.  In addition, he reports she complained of right-sided chest pain.  Today the assisted living facility called her family and reported that the patient is short of breath and is having right-sided chest pain.  As a result, she was sent to the emergency room.  Physically, she reports she does not have any pain and does not really interact much as she is dozing off.  There is no report of fever or chills that the family is aware of.  The patient generally is not active and has a sedentary lifestyle.  Therefore, she is usually sitting at a kitchen chair and does not do much activity, but she does use a walker to get around in the facility.  Per family, the patient has been generally weak over the last 1 month and minimally active.       In the emergency room, the patient was evaluated by Dr. Bernard.  Her vital signs showed a temperature of 99.3, pulse in the 70s, blood pressure ranging from -teens. She is saturating 99% on 2 liters nasal cannula though no  hypoxia is documented.  Her laboratory was notable for creatinine of 1.29 and BUN of 42.  CBC showed WBC of 15.8, hemoglobin of 11.4.  Troponin was negative.  N-terminal proBNP was in the normal range.  The D-dimer was elevated to 2.2.  Chest CT was obtained which showed a right-sided acute pulmonary emboli.  There were also innumerable small bilateral pulmonary nodules, most indeterminate in nature, measuring up to 1 cm and pulmonary mets not excluded.  Given her acute pulmonary embolism, admission was requested for further management.     Hospital Course   Christy Rueda was admitted on 6/1/2017.  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      Acute right-sided pulmonary emboli Presented with right-sided chest pain and shortness of breath. Generally has a sedentary lifestyle with further decrease in activity recently. Also with history of breast cancer s/p mastectomy and chemotherapy ~15-20 years ago, now with innumerable pulmonary nodules as noted below, can not rule out metastatic disease, may contribute to propensity for clotting. Risks/benefits conversation regarding anticoagulation held during admission. Family agreeable to anticoagulation. LE US negative for DVT. Discussed with family echocardiogram as part of typical PE work-up. As LE US negative for DVT would be unlikely to pursue IVC filter placement with any RV dysfunction, especially given her age and questionable utility of this intervention when anticoagulation is not contraindicated. Family agreeable to forgo. Note troponin negative,  NtpBNP within referance range.  - Warfarin, pharm to dose.   - Plan for indefinite anticoagulation, however can be reassessed if she has high risks events such as falls at home that make risk of bleeding unacceptably high, hopefully after at least a 3 month course.       Innumerable bilateral pulmonary nodules Indeterminate in nature on CT. Can not exclude metastatic disease, does have history of breast cancer.  - Per report, discussed with family. At this point would not pursue any systemic treatment if diagnosed with metastatic disease, agreeable to forgo any additional testing or serial imaging as will not .      Acute pulmonary edema Noted to require oxygen overnight, and more consistent oxygen needs 6/4/2017. CXR with diffuse bilateral pulmonary opacities suggestive of edema. May be iatrogenic in nature secondary to previous IV fluids, no other clear provocative factors. Last echocardiogram in 2013 with EF 65-70% with moderate diastolic dysfunction. Have deferred repeat echocardiogram for PE as discussed above.  - Wean oxygen as able      Acute kidney injury, suspect pre-renal Creatinine is mildly elevated at 1.29 on admission from baseline around 1.1. BUN elevated as well, >20:1.   - Creatinine improved       Nausea Noted after lunch 6/3/2017. Per family, she has eaten significantly more 6/3/17 than is her usual. Having BM. Non-tender on exam.  - Resolved 6/4/2017      Abdominal tenderness Noted on admission, none present at this time. LFTs unremarkable. Unclear etiology, but appears to have resolved.  - Monitor, resolved.       Leukocytosis, Pyuria LFTs unremarkable. UA mildly abnormal, difficult to interpret in setting of chronic catheterization. UC with 10-50,000 E coli.   - Without suprapubic tenderness or other systemic signs of infection such as fever.   - Monitor.      Hyperlipidemia: Stable.   - Continue prior to admission statin.       Hypertension Blood pressure adequately  controlled.   - Continue prior to admission metoprolol, amlodipine.       History of TIA: Aspirin has been discontinued given initiation of formal anticoagulation   - Monitor.      Chronic pain: Stable.   - Continue prior to admission acetaminophen twice daily and as needed.       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   - Continue Hidalgo catheter      Advanced care planning Introduced role of hospice to family. Discussed present plan for discharge to TCU and rehabilitation, but encouraged them to discuss at any time    Pending Results   These results will be followed up by PCP  Unresulted Labs Ordered in the Past 30 Days of this Admission     No orders found from 4/2/2017 to 6/2/2017.          Code Status   DNR / DNI       Primary Care Physician   Kathy Pratt    Physical Exam   Temp: 99  F (37.2  C) Temp src: Axillary BP: 153/76 Pulse: 69 Heart Rate: 73 Resp: 18 SpO2: 92 % O2 Device: Nasal cannula Oxygen Delivery: 1 LPM  Vitals:    06/04/17 0548 06/05/17 0500 06/06/17 0643   Weight: 51.5 kg (113 lb 8.6 oz) 52.7 kg (116 lb 2.9 oz) 52.4 kg (115 lb 8.3 oz)     Vital Signs with Ranges  Temp:  [97.6  F (36.4  C)-99  F (37.2  C)] 99  F (37.2  C)  Pulse:  [69] 69  Heart Rate:  [73-77] 73  Resp:  [16-19] 18  BP: (136-153)/(75-76) 153/76  SpO2:  [85 %-95 %] 92 %  I/O last 3 completed shifts:  In: 160 [P.O.:160]  Out: 1000 [Urine:1000]    GENERAL: Alert and oriented. NAD. Conversational, appropriate. Hard of hearing. Pleasant.   HEENT: Normocephalic. EOMI. No icterus or injection. Nares normal. NC in place.   LUNGS: Decrement bilaterally, improved. No dyspnea at rest.   HEART: Regular rate. Extremities perfused.   ABDOMEN: Soft, nontender, and nondistended. Positive bowel sounds.   EXTREMITIES: LE edema noted.   NEUROLOGIC: Moves extremities x4. No acute focal neurologic abnormalities noted.     Discharge Disposition   Discharged to TCU  Condition at  discharge: Stable    Consultations This Hospital Stay   PHARMACY TO DOSE HEPARIN  SOCIAL WORK IP CONSULT  PHYSICAL THERAPY ADULT IP CONSULT  OCCUPATIONAL THERAPY ADULT IP CONSULT  PHARMACY TO DOSE HEPARIN  PHARMACY TO DOSE WARFARIN  PALLIATIVE CARE ADULT IP CONSULT  PHYSICAL THERAPY ADULT IP CONSULT  OCCUPATIONAL THERAPY ADULT IP CONSULT    Time Spent on this Encounter   IElsieo, personally saw the patient today and spent greater than 30 minutes discharging this patient.    Discharge Orders     General info for SNF   Length of Stay Estimate: Short Term Care: Estimated # of Days <30  Condition at Discharge: Improving  Level of care:skilled   Rehabilitation Potential: Fair  Admission H&P remains valid and up-to-date: Yes  Recent Chemotherapy: N/A  Use Nursing Home Standing Orders: Yes     Mantoux instructions   Give two-step Mantoux (PPD) Per Facility Policy Yes     Reason for your hospital stay   Pulmonary embolism     Follow Up and recommended labs and tests   Follow up with senior living physician.  The following labs/tests are recommended: cbc/bmp/inr.     Activity - Up with nursing assistance     DNR/DNI     Physical Therapy Adult Consult   Evaluate and treat as clinically indicated.    Reason:  Physical deconditioning.     Occupational Therapy Adult Consult   Evaluate and treat as clinically indicated.    Reason:  Physical deconditioning.     Oxygen - Nasal cannula   1 Lpm by nasal cannula to keep O2 sats 92% or greater.     Fall precautions     Advance Diet as Tolerated   Follow this diet upon discharge: Orders Placed This Encounter     Combination Diet Regular Diet Adult       Discharge Medications   Current Discharge Medication List      START taking these medications    Details   warfarin (COUMADIN) 1 MG tablet Take 1 tablet (1 mg) by mouth daily  Qty: 30 tablet, Refills: 0    Associated Diagnoses: Other acute pulmonary embolism (H)         CONTINUE these medications which have CHANGED     Details   traMADol (ULTRAM) 50 MG tablet Take 1 tablet (50 mg) by mouth every 6 hours as needed  Qty: 28 tablet, Refills: 0    Associated Diagnoses: Other acute pulmonary embolism (H)         CONTINUE these medications which have NOT CHANGED    Details   multivitamin, therapeutic with minerals (MULTI-VITAMIN) TABS tablet Take 1 tablet by mouth daily      !! Acetaminophen (TYLENOL PO) Take 500 mg by mouth daily as needed for mild pain or fever At MN if needed      !! DOCUSATE SODIUM PO Take 100-400 mg by mouth as needed for constipation      polyethylene glycol (MIRALAX/GLYCOLAX) packet Take 1 packet by mouth Every Mon, Wed, Fri Morning       BISACODYL RE Place 1 suppository rectally daily as needed      psyllium (METAMUCIL/KONSYL) packet Take 1 packet by mouth daily       !! Acetaminophen (TYLENOL PO) Take 1,000 mg by mouth 2 times daily       AmLODIPine Besylate (NORVASC PO) Take 2.5 mg by mouth daily       VITAMIN D, CHOLECALCIFEROL, PO Take 1,000 Units by mouth daily      OMEPRAZOLE PO Take 20 mg by mouth daily.      ATORVASTATIN CALCIUM PO Take 10 mg by mouth At Bedtime.      METOPROLOL SUCCINATE PO Take 50 mg by mouth daily       Levothyroxine Sodium (LEVOXYL PO) Take 88 mcg by mouth daily.      !! DOCUSATE SODIUM PO Take 300 mg by mouth every evening        !! - Potential duplicate medications found. Please discuss with provider.      STOP taking these medications       CIPROFLOXACIN PO Comments:   Reason for Stopping:         sodium phosphate (FLEET ENEMA) 7-19 GM/118ML rectal enema Comments:   Reason for Stopping:         HYDROcodone-acetaminophen (VICODIN) 5-500 MG per tablet Comments:   Reason for Stopping:         aspirin 81 MG EC tablet Comments:   Reason for Stopping:         LOSARTAN POTASSIUM PO Comments:   Reason for Stopping:             Allergies   Allergies   Allergen Reactions     Demerol Nausea and Vomiting     Levaquin      Morphine      Thinks nausea and vomiting with morphine but is not  positive     Penicillins Nausea and Vomiting     Data   Most Recent 3 CBC's:  Recent Labs   Lab Test  06/06/17   0815  06/03/17   0814  06/02/17   0544   WBC  4.3  9.7  17.2*   HGB  10.6*  10.7*  10.5*   MCV  105*  102*  102*   PLT  235  238  207      Most Recent 3 BMP's:  Recent Labs   Lab Test  06/06/17   0815  06/05/17   1215  06/05/17   0809   06/03/17   0814   NA  144  144  146*   < >  145*   POTASSIUM  4.5   --   4.2   --   4.2   CHLORIDE  109   --   112*   --   112*   CO2  31   --   32   --   25   BUN  16   --   22   --   23   CR  0.70   --   0.85   --   0.74   ANIONGAP  4   --   2*   --   8   NAJMA  9.5   --   9.4   --   9.2   GLC  85   --   86   --   86    < > = values in this interval not displayed.     Most Recent 2 LFT's:  Recent Labs   Lab Test  06/02/17   0544  09/25/16   1015   AST  14  19   ALT  15  18   ALKPHOS  56  64   BILITOTAL  0.3  0.3     Most Recent INR's and Anticoagulation Dosing History:  Anticoagulation Dose History     Recent Dosing and Labs Latest Ref Rng & Units 6/18/2013 6/1/2017 6/2/2017 6/3/2017 6/4/2017 6/5/2017 6/6/2017    Warfarin 2.5 mg - - 2.5 mg 2.5 mg 2.5 mg 2.5 mg - -    INR 0.86 - 1.14 1.04 1.05 1.30(H) 1.50(H) 1.88(H) 2.95(H) 2.82(H)        Most Recent 3 Troponin's:  Recent Labs   Lab Test  06/01/17   1059  09/25/16   1015  06/18/13   1548   TROPI  <0.015  The 99th percentile for upper reference range is 0.045 ug/L.  Troponin values in   the range of 0.045 - 0.120 ug/L may be associated with risks of adverse   clinical events.    <0.015  The 99th percentile for upper reference range is 0.045 ug/L.  Troponin values in   the range of 0.045 - 0.120 ug/L may be associated with risks of adverse   clinical events.    <0.012     Most Recent Cholesterol Panel:  Recent Labs   Lab Test  06/19/13   0800   CHOL  156   LDL  87   HDL  59   TRIG  52     Most Recent 6 Bacteria Isolates From Any Culture (See EPIC Reports for Culture Details):  Recent Labs   Lab Test  06/01/17   1920   09/25/16   1240  09/25/16   1015  03/13/11   1650   CULT  10,000 to 50,000 colonies/mL Escherichia coli*  >100,000 colonies/mL Escherichia coli*  No growth  >100,000 colonies/mL Escherichia coli     Most Recent TSH, T4 and A1c Labs:  Recent Labs   Lab Test  06/18/13   1440   TSH  0.60     Results for orders placed or performed during the hospital encounter of 06/01/17   XR Chest 2 Views    Narrative    XR CHEST 2 VW 6/1/2017 11:18 AM    COMPARISON: 9/25/2016    HISTORY: Dyspnea      Impression    IMPRESSION: Enlarged cardiac silhouette is again seen and unchanged.  Mild left basilar atelectasis/consolidation. No significant pleural  effusion on either side. No pneumothorax. Wedge compression deformity  in the upper thoracic spine is again seen and unchanged.    BRYNN BOCANEGRA   Chest CT, IV contrast only - PE protocol     Value    Radiologist flags Acute pulmonary emboli. (AA)    Narrative    CT CHEST PULMONARY EMBOLISM WITH CONTRAST  6/1/2017 1:11 PM     HISTORY:   Dyspnea, chest pain and elevated D-dimer.    TECHNIQUE:    Helical axial scans from lung apices through lung bases  with 53 mL Isovue-370 IV contrast. Radiation dose for this scan was  reduced using automated exposure control, adjustment of the mA and/or  kV according to patient size, or iterative reconstruction technique.    COMPARISON:    None.    FINDINGS:    There is thrombus within the right descending pulmonary  artery and adjacent multiple segmental branches consistent with acute  pulmonary embolism. Additional pulmonary emboli are seen in the right  middle lobe pulmonary arterial branches with minimal extension of one  of the thrombi into an upper lobe pulmonary artery. No left-sided  pulmonary emboli.    Vascular calcifications including coronary artery calcifications.  Tortuosity of the thoracic aorta with no CT evidence for aortic  dissection. Calcified lymph nodes left hilum consistent with old  granulomatous disease.    There are innumerable  pulmonary nodules scattered in the lungs  bilaterally, largest measuring up to 1 cm. A few of these are  calcified but most are noncalcified and are indeterminate. This could  represent metastatic disease and clinical correlation is recommended.  Mild scattered platelike atelectasis posterior lung bases. No  acute-appearing confluent infiltrates.    Visualized upper abdomen shows cholelithiasis. Metallic artifact from  spinal fusion hardware obscures much of the included abdomen.      Impression    IMPRESSION:  1. Right-sided acute pulmonary emboli.  2. Vascular calcifications and tortuous thoracic aorta.  3. Old granulomatous disease left hilum.  4. Innumerable small bilateral pulmonary nodules, most indeterminate  in nature, measuring up to 1 cm. Pulmonary metastases are not  excluded.  5. Cholelithiasis.      [Critical Result: Acute pulmonary emboli.]    Finding was identified on 6/1/2017 1:16 PM.     Dr. Bernard was contacted by me on 6/1/2017 1:28 PM and verbalized  understanding of the critical result.     SHANNAN FERRARA MD   US Lower Extremity Venous Duplex Bilateral    Narrative    VENOUS ULTRASOUND BILATERAL LOWER EXTREMITY  6/1/2017  4:54 PM     HISTORY: Swelling.    COMPARISON: None.    FINDINGS: Examination of the deep veins with graded compression and  color flow Doppler with spectral wave form analysis shows no evidence  of thrombus in the common femoral vein, femoral vein, popliteal vein  or calf veins. There is no venous insufficiency. Exam is mildly  limited by calf edema.      Impression    IMPRESSION: No evidence of deep venous thrombosis. Exam is mildly  limited by calf edema.    MARIS MONDRAGON MD   XR Chest 2 Views    Narrative    XR CHEST 2 VW 6/4/2017 1:16 PM    HISTORY: Hypoxia.    COMPARISON: June 1, 2017.      Impression    IMPRESSION: There are diffuse bilateral pulmonary opacities,  suggestive of edema, with likely small bilateral effusions. No  pneumothorax appreciated. Cardiomegaly as  before. Lateral views  demonstrate multiple wedge compression deformities in the upper  thoracic spine as before.    HERMINIO ALVAREZ MD   XR Chest 1 View    Narrative    CHEST ONE VIEW UPRIGHT  6/5/2017 2:35 PM     HISTORY: Interval.    COMPARISON: 6/4/2017.      Impression    IMPRESSION: Stable interstitial edema and/or fibrosis since the  comparison study. No new infiltrates.    MARVA LEVIN MD

## 2017-06-06 NOTE — PLAN OF CARE
Problem: Goal Outcome Summary  Goal: Goal Outcome Summary  Outcome: No Change  A/O x 3-disoriented to time; forgetful, [place].  VSS on 1 LPM NC.  Attempted to wean pt. Off O2; desating to 85-89 % on 0.5 LPM; maintaining > 92% on 1 LPM.  Recent INR 2.95. LS-diminished x all lobes; Anterior RLL and LLL-crackles; infrequent dry cough.  +1- +2 BLE.  Hidalgo-patent.  Multiple wounds, see chart.  Assist x 1, walker/gait belt.  Palliative consult today.  Possible D/C to TCU in 1-2 days.

## 2017-10-04 ENCOUNTER — DOCUMENTATION ONLY (OUTPATIENT)
Dept: OTHER | Facility: CLINIC | Age: 82
End: 2017-10-04

## 2017-10-04 DIAGNOSIS — Z71.89 ACP (ADVANCE CARE PLANNING): Chronic | ICD-10-CM

## 2019-11-04 NOTE — IP AVS SNAPSHOT
MRN:[id number]                      After Visit Summary   6/1/2017    Christy Rueda    MRN: [id number]           Thank you!     Thank you for choosing [facility] for your care. Our goal is always to provide you with excellent care. Hearing back from our patients is one way we can continue to improve our services. Please take a few minutes to complete the written survey that you may receive in the mail after you visit with us. Thank you!        Patient Information     Date Of Birth          8/31/1915        Designated Caregiver       Most Recent Value    Caregiver    Will someone help with your care after discharge? yes    Name of designated caregiver nOeil Rueda    Phone number of caregiver [phone]    Caregiver address -      About your hospital stay     You were admitted on:  June 1, 2017 You last received care in the:  [place] [address] [facility]    You were discharged on:  June 6, 2017        Reason for your hospital stay       Pulmonary embolism                  Who to Call     For medical emergencies, please call 911.  For non-urgent questions about your medical care, please call your primary care provider or clinic, [phone]          Attending Provider     Provider Specialty    Kike Bernard MD Emergency Medicine    [facility]       Primary Care Provider Office Phone # Fax #    Kathy Pratt [phone]      After Care Instructions     Activity - Up with nursing assistance           Advance Diet as Tolerated       Follow this diet upon discharge: Orders Placed This Encounter      Combination Diet Regular Diet Adult            Fall precautions           General info for SNF       Length of Stay Estimate: Short Term Care: Estimated # of Days <30  Condition at Discharge: Improving  Level of care:skilled   Rehabilitation Potential: Fair  Admission H&P remains valid and up-to-date: Yes  Recent Chemotherapy: N/A  Use    Problem: SLP Goal  Goal: SLP Goal  Description  Short Term Goals:  1. Pt will participate in clinical swallow assessment to determine LRD.-met 11/4  2. Pt will consume Puree/Thin liquids for pleasure without any overt s/s of aspiration.    Outcome: Ongoing, Progressing   11/04: Bedside swallow study completed. Daughter and brother at the bedside providing translation. Pt trialed puree and thin liquids with poor labial seal, reduced oral bolus awareness, delayed A-P transfer, oral residue s/p swallow, and significantly delayed swallow initiation. Pt required syringe for liquid trials. Aspiration risks vs QOL reviewed with pt and family. Diet REC: Puree/Thin liquids given 1:1 assist and pt fully awake/alert for PO trials. Pt must remain upright for 30 mins s/p intake. SLP to f/u.   Nursing Home Standing Orders: Yes            Mantoux instructions       Give two-step Mantoux (PPD) Per Facility Policy Yes            Oxygen - Nasal cannula       1 Lpm by nasal cannula to keep O2 sats 92% or greater.                  Follow-up Appointments     Follow Up and recommended labs and tests       Follow up with [facility] physician.  The following labs/tests are recommended: cbc/bmp/inr.                  Additional Services     Occupational Therapy Adult Consult       Evaluate and treat as clinically indicated.    Reason:  Physical deconditioning.            Physical Therapy Adult Consult       Evaluate and treat as clinically indicated.    Reason:  Physical deconditioning.                  Further instructions from your care team       Discharge to [facility], phone [phone]    Warfarin Instruction     You have started taking a medicine called warfarin. This is a blood-thinning medicine (anticoagulant). It helps prevent and treat blood clots.      Before leaving the hospital, make sure you know how much to take and how long to take it.      You will need regular blood tests to make sure your blood is clotting safely. It is very important to see your doctor for regular blood tests.    Talk to your doctor before taking any new medicine (this includes over-the-counter drugs and herbal products). Many medicines can interact with warfarin. This may cause more bleeding or too much clotting.     Eating a lot of vitamin K--found in green, leafy vegetables--can change the way warfarin works in your body. Do NOT avoid these foods. Instead, try to eat the same amount each day.     Bleeding is the most common side-effect of warfarin. You may notice bleeding gums, a bloody nose, bruises and bleeding longer when you cut yourself. See a doctor at once if:   o You cough up blood  o You find blood in your stool (poop)  o You have a deep cut, or a cut that bleeds longer than 10 minutes   o You have a  "bad cut, hard fall, accident or hit your head (go to urgent care or the emergency room).    For women who can get pregnant: This medicine can harm an unborn baby. Be very careful not to get pregnant while taking this medicine. If you think you might be pregnant, call your doctor right away.    For more information, read \"Guide to Warfarin Therapy,  the booklet you received in the hospital.        Pending Results     No orders found from 2017 to 2017.            Statement of Approval     Ordered          17 1203  I have reviewed and agree with all the recommendations and orders detailed in this document.  EFFECTIVE NOW     Approved and electronically signed by:  Eliseo Douglass DO             Admission Information     Date & Time Provider Department Dept. Phone    2017 Sunita Gan MD [place] [address] [facility] [phone]      Your Vitals Were     Blood Pressure Pulse Temperature Respirations Height Weight    144/76 (BP Location: Left arm) 69 98.3  F (36.8  C) (Oral) 18 1.473 m (4' 10\") 52.4 kg (115 lb 8.3 oz)    Last Period Pulse Oximetry BMI (Body Mass Index)             1950 93% 24.14 kg/m2         MyChart Information     [employer] lets you send messages to your doctor, view your test results, renew your prescriptions, schedule appointments and more. To sign up, go to [email or web address] . Click on \"Log in\" on the left side of the screen, which will take you to the Welcome page. Then click on \"Sign up Now\" on the right side of the page.     You will be asked to enter the access code listed below, as well as some personal information. Please follow the directions to create your username and password.     Your access code is: [email or web address]  Expires: 2017 11:41 AM     Your access code will  in 90 days. If you need help or a new code, please call your [place] clinic or [phone].        Care EveryWhere ID     This is your Care EveryWhere ID. " This could be used by other organizations to access your [facility] medical records  [id number]           Review of your medicines      START taking        Dose / Directions    warfarin 1 MG tablet   Commonly known as:  COUMADIN   Used for:  Other acute pulmonary embolism (H)        Dose:  1 mg   Take 1 tablet (1 mg) by mouth daily   Quantity:  30 tablet   Refills:  0         CONTINUE these medicines which may have CHANGED, or have new prescriptions. If we are uncertain of the size of tablets/capsules you have at home, strength may be listed as something that might have changed.        Dose / Directions    traMADol 50 MG tablet   Commonly known as:  ULTRAM   This may have changed:  reasons to take this   Used for:  Other acute pulmonary embolism (H)        Dose:  50 mg   Take 1 tablet (50 mg) by mouth every 6 hours as needed   Quantity:  28 tablet   Refills:  0         CONTINUE these medicines which have NOT CHANGED        Dose / Directions    ATORVASTATIN CALCIUM PO        Dose:  10 mg   Take 10 mg by mouth At Bedtime.   Refills:  0       BISACODYL RE        Dose:  1 suppository   Place 1 suppository rectally daily as needed   Refills:  0       * DOCUSATE SODIUM PO        Dose:  300 mg   Take 300 mg by mouth every evening   Refills:  0       * DOCUSATE SODIUM PO        Dose:  100-400 mg   Take 100-400 mg by mouth as needed for constipation   Refills:  0       LEVOXYL PO        Dose:  88 mcg   Take 88 mcg by mouth daily.   Refills:  0       METOPROLOL SUCCINATE PO        Dose:  50 mg   Take 50 mg by mouth daily   Refills:  0       Multi-vitamin Tabs tablet        Dose:  1 tablet   Take 1 tablet by mouth daily   Refills:  0       NORVASC PO   Indication:  High Blood Pressure        Dose:  2.5 mg   Take 2.5 mg by mouth daily   Refills:  0       OMEPRAZOLE PO        Dose:  20 mg   Take 20 mg by mouth daily.   Refills:  0       polyethylene glycol Packet   Commonly known as:  MIRALAX/GLYCOLAX        Dose:  1 packet    Take 1 packet by mouth Every Mon, Wed, Fri Morning   Refills:  0       psyllium Packet   Commonly known as:  METAMUCIL/KONSYL        Dose:  1 packet   Take 1 packet by mouth daily   Refills:  0       * TYLENOL PO   Indication:  Pain        Dose:  1000 mg   Take 1,000 mg by mouth 2 times daily   Refills:  0       * TYLENOL PO        Dose:  500 mg   Take 500 mg by mouth daily as needed for mild pain or fever At [place] if needed   Refills:  0       VITAMIN D (CHOLECALCIFEROL) PO        Dose:  1000 Units   Take 1,000 Units by mouth daily   Refills:  0       * Notice:  This list has 4 medication(s) that are the same as other medications prescribed for you. Read the directions carefully, and ask your doctor or other care provider to review them with you.      STOP taking     aspirin 81 MG EC tablet           CIPROFLOXACIN PO           HYDROcodone-acetaminophen 5-500 MG per tablet   Commonly known as:  VICODIN           LOSARTAN POTASSIUM PO           sodium phosphate 7-19 GM/118ML rectal enema                Where to get your medicines      These medications were sent to [facility] [place] - [address]  [address] [facility] [address], [facility] [address]     Phone:  [phone]     warfarin 1 MG tablet         Some of these will need a paper prescription and others can be bought over the counter. Ask your nurse if you have questions.     Bring a paper prescription for each of these medications     traMADol 50 MG tablet                Protect others around you: Learn how to safely use, store and throw away your medicines at www.disposemymeds.org.             Medication List: This is a list of all your medications and when to take them. Check marks below indicate your daily home schedule. Keep this list as a reference.      Medications           Morning Afternoon Evening Bedtime As Needed    ATORVASTATIN CALCIUM PO   Take 10 mg by mouth At Bedtime.   Last time this was given:  10 mg on 6/5/2017  9:01 PM                                 BISACODYL RE   Place 1 suppository rectally daily as needed                                * DOCUSATE SODIUM PO   Take 300 mg by mouth every evening   Last time this was given:  300 mg on 6/5/2017  8:08 PM                                * DOCUSATE SODIUM PO   Take 100-400 mg by mouth as needed for constipation   Last time this was given:  300 mg on 6/5/2017  8:08 PM                                LEVOXYL PO   Take 88 mcg by mouth daily.   Last time this was given:  88 mcg on 6/6/2017  9:06 AM                                METOPROLOL SUCCINATE PO   Take 50 mg by mouth daily   Last time this was given:  50 mg on 6/6/2017  9:06 AM                                Multi-vitamin Tabs tablet   Take 1 tablet by mouth daily                                NORVASC PO   Take 2.5 mg by mouth daily   Last time this was given:  2.5 mg on 6/6/2017  9:07 AM                                OMEPRAZOLE PO   Take 20 mg by mouth daily.   Last time this was given:  20 mg on 6/6/2017  9:07 AM                                polyethylene glycol Packet   Commonly known as:  MIRALAX/GLYCOLAX   Take 1 packet by mouth Every Mon, Wed, Fri Morning   Last time this was given:  17 g on 6/5/2017  9:35 AM                                psyllium Packet   Commonly known as:  METAMUCIL/KONSYL   Take 1 packet by mouth daily   Last time this was given:  1 packet on 6/6/2017  9:07 AM                                traMADol 50 MG tablet   Commonly known as:  ULTRAM   Take 1 tablet (50 mg) by mouth every 6 hours as needed                                * TYLENOL PO   Take 1,000 mg by mouth 2 times daily   Last time this was given:  1,000 mg on 6/6/2017  9:07 AM                                * TYLENOL PO   Take 500 mg by mouth daily as needed for mild pain or fever At [place] if needed   Last time this was given:  1,000 mg on 6/6/2017  9:07 AM                                VITAMIN D (CHOLECALCIFEROL) PO   Take 1,000 Units by mouth daily                                 warfarin 1 MG tablet   Commonly known as:  COUMADIN   Take 1 tablet (1 mg) by mouth daily   Last time this was given:  2.5 mg on 6/4/2017  6:06 PM                                * Notice:  This list has 4 medication(s) that are the same as other medications prescribed for you. Read the directions carefully, and ask your doctor or other care provider to review them with you.

## 2020-05-31 NOTE — PROGRESS NOTES
Bedside and Verbal shift change report given to Alfredo Ballesteros (oncoming nurse) by Namrata French RN (offgoing nurse). Report included the following information SBAR, Kardex, ED Summary, Procedure Summary, Intake/Output, MAR, Recent Results and Cardiac Rhythm NSR. [facility]    Hospitalist Progress Note    Date of Service (when I saw the patient): 06/02/2017    Assessment & Plan   Christy Rueda is a 89 year-old female with a past medical history of hypertension, hyperlipidemia, transient ischemic attack, history of left-sided breast cancer status post mastectomy, chronic Hidalgo catheter due to urinary retention and hypothyroidism who presents with shortness of breath and right-sided chest pain admitted on 6/1/2017 after being diagnosed with pulmonary embolism.     Acute right-sided pulmonary emboli  Presented with right-sided chest pain and shortness of breath. Generally has a sedentary lifestyle with further decrease in activity recently. Also with history of breast cancer s/p mastectomy and chemotherapy ~15-20 years ago, now with innumerable pulmonary nodules as noted below, can not rule out metastatic disease, may contribute to propensity for clotting.   - Risks/benefits conversation held on admission by admitting provider, discussed again 6/2/2017. Family agreeable to anticoagulation. Continue heparin drip as bridge to warfarin.   - At this point would plan for indefinite anticoagulation, however can be reassessed if she has high risks events such as falls at home that make risk of bleeding unacceptably high.   - LE US negative for DVT  - Discussed with family echocardiogram as part of typical PE work-up. As LE US negative for DVT would be unlikely to pursue IVC filter placement with any RV dysfunction, especially given her age and questionable utility of this intervention when anticoagulation is not contraindicated. Family agreeable to forgo. Note troponin negative, NtpBNP within referance range.    Innumerable bilateral pulmonary nodules  Indeterminate in nature on CT. Can not exclude metastatic disease, does have history of breast cancer.   - Discussed with family. At this point would not pursue any systemic treatment if diagnosed with metastatic  disease, agreeable to forgo any additional testing or serial imaging as will not .    Acute kidney injury, suspect pre-renal    Creatinine is mildly elevated at 1.29 on admission from baseline around 1.1. BUN elevated as well, >20:1.   - Creatinine improved to below baseline with IV fluids, suggestive of pre-renal etiology  - Monitor off of IV fluids  - Monitor BMP    Abdominal tenderness  Noted on admission, none present at this time. LFTs unremarkable.   - Unclear etiology, but appears resolved    Leukocytosis  - LFTs unremarkable  - UA mildly abnormal, difficult to interpret in setting of chronic catheterization. UC with 10-50,000 GNR. Otherwise without suprapubic tenderness or other systemic signs of infection such as fever.   - Hold antibiotics for now, recheck WBC 6/3/17 and if continues to trend up without alternative explanation, consider antibiotics pending further culture data.    Hyperlipidemia  Continue prior to admission statin.     Hypertension  - Blood pressure well-controlled. Continue prior to admission metoprolol, amlodipine.   - Aspirin for prima as well as amlodipine.  As she is being started anticoagulation, holding off on baby aspirin daily.     History of TIA  - Aspirin has been discontinued given initiation of formal anticoagulation     Chronic pain  Continue prior to admission acetaminophen twice daily and as needed.     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   - Continue Hidalgo catheter    DVT Prophylaxis: Heparin drip   Code Status: DNR/DNI    Disposition: Expected discharge to TCU in 1-2 days pending ongoing improvement in respiratory status    Soren Pepper    Interval History   No acute events overnight. She denies any chest pain at present. Denies any shortness of breath at rest. No other new complaints.     -Data reviewed today: I reviewed all new labs and imaging results over the last 24 hours. I personally reviewed  "no images or EKG's today.    Physical Exam   Temp: 97.4  F (36.3  C) Temp src: Oral BP: 123/65 Pulse: 60 Heart Rate: 74 Resp: 18 SpO2: 92 % O2 Device: None (Room air) Oxygen Delivery: 1 LPM  Vitals:    06/01/17 1220 06/02/17 0613   Weight: 47.6 kg (105 lb) 48.4 kg (106 lb 11.2 oz)     Vital Signs with Ranges  Temp:  [97.4  F (36.3  C)-99  F (37.2  C)] 97.4  F (36.3  C)  Pulse:  [60-89] 60  Heart Rate:  [70-91] 74  Resp:  [16-18] 18  BP: (116-135)/(59-69) 123/65  SpO2:  [92 %-99 %] 92 %  I/O last 3 completed shifts:  In: 462 [P.O.:250; I.V.:212]  Out: 650 [Urine:650]    Constitutional: Elderly female, NAD  Respiratory: Clear to auscultation bilaterally, good air movement bilaterally  Cardiovascular: RRR, no m/r/g. 2+ pitting edema to mid-shin   GI: Soft, non-tender, non-distended. BS normoactive.   Skin/Integumen: Warm, dry  Neuro: Alert. Oriented to person, \"hospital\" (not specific one), not oriented to date. Following commands.     Medications     HEParin 700 Units/hr (06/02/17 0907)     - MEDICATION INSTRUCTIONS -       Warfarin Therapy Reminder         warfarin  2.5 mg Oral ONCE at 18:00     sodium chloride (PF)  3 mL Intracatheter Q8H     acetaminophen (TYLENOL) tablet 1,000 mg  1,000 mg Oral BID     amLODIPine (NORVASC) tablet 2.5 mg  2.5 mg Oral Daily     atorvastatin (LIPITOR) tablet 10 mg  10 mg Oral At Bedtime     docusate sodium (COLACE) capsule 300 mg  300 mg Oral QPM     levothyroxine (SYNTHROID/LEVOTHROID) tablet 88 mcg  88 mcg Oral Daily     metoprolol (TOPROL-XL) 24 hr tablet 50 mg  50 mg Oral Daily     omeprazole (priLOSEC) CR capsule 20 mg  20 mg Oral Daily     polyethylene glycol  17 g Oral Q Mon Wed Fri AM     psyllium  1 packet Oral Daily       Data     Recent Labs  Lab 06/02/17  0544 06/01/17  1059   WBC 17.2* 15.8*   HGB 10.5* 11.4*   * 101*    246   INR 1.30* 1.05    142   POTASSIUM 4.1 4.1   CHLORIDE 113* 107   CO2 24 27   BUN 32* 42*   CR 0.85 1.29*   ANIONGAP 6 8   NAJMA " 8.7 9.7   GLC 92 117*   ALBUMIN 2.5*  --    PROTTOTAL 6.4*  --    BILITOTAL 0.3  --    ALKPHOS 56  --    ALT 15  --    AST 14  --    TROPI  --  <0.015The 99th percentile for upper reference range is 0.045 ug/L.  Troponin values in the range of 0.045 - 0.120 ug/L may be associated with risks of adverse clinical events.       Recent Results (from the past 24 hour(s))   US Lower Extremity Venous Duplex Bilateral    Narrative    VENOUS ULTRASOUND BILATERAL LOWER EXTREMITY  6/1/2017  4:54 PM     HISTORY: Swelling.    COMPARISON: None.    FINDINGS: Examination of the deep veins with graded compression and  color flow Doppler with spectral wave form analysis shows no evidence  of thrombus in the common femoral vein, femoral vein, popliteal vein  or calf veins. There is no venous insufficiency. Exam is mildly  limited by calf edema.      Impression    IMPRESSION: No evidence of deep venous thrombosis. Exam is mildly  limited by calf edema.    MARIS MONDRAGON MD